# Patient Record
Sex: MALE | Race: WHITE | NOT HISPANIC OR LATINO | Employment: FULL TIME | URBAN - NONMETROPOLITAN AREA
[De-identification: names, ages, dates, MRNs, and addresses within clinical notes are randomized per-mention and may not be internally consistent; named-entity substitution may affect disease eponyms.]

---

## 2021-02-11 DIAGNOSIS — Z23 ENCOUNTER FOR IMMUNIZATION: ICD-10-CM

## 2022-08-31 ENCOUNTER — HOSPITAL ENCOUNTER (OUTPATIENT)
Dept: CT IMAGING | Facility: HOSPITAL | Age: 82
Discharge: HOME | End: 2022-08-31
Payer: MEDICARE

## 2022-08-31 DIAGNOSIS — K11.23 CHRONIC PAROTITIS: ICD-10-CM

## 2022-08-31 PROCEDURE — G1004 CDSM NDSC: HCPCS

## 2022-08-31 PROCEDURE — 70490 CT SOFT TISSUE NECK W/O DYE: CPT

## 2023-11-11 ENCOUNTER — APPOINTMENT (EMERGENCY)
Dept: CT IMAGING | Facility: HOSPITAL | Age: 83
End: 2023-11-11
Payer: MEDICARE

## 2023-11-11 ENCOUNTER — HOSPITAL ENCOUNTER (EMERGENCY)
Facility: HOSPITAL | Age: 83
Discharge: HOME | End: 2023-11-11
Attending: EMERGENCY MEDICINE
Payer: MEDICARE

## 2023-11-11 VITALS
BODY MASS INDEX: 24.78 KG/M2 | SYSTOLIC BLOOD PRESSURE: 121 MMHG | HEIGHT: 70 IN | OXYGEN SATURATION: 95 % | TEMPERATURE: 99.9 F | RESPIRATION RATE: 16 BRPM | DIASTOLIC BLOOD PRESSURE: 58 MMHG | WEIGHT: 173.06 LBS | HEART RATE: 97 BPM

## 2023-11-11 DIAGNOSIS — R50.9 ACUTE FEBRILE ILLNESS: Primary | ICD-10-CM

## 2023-11-11 LAB
ALBUMIN SERPL BCP-MCNC: 4 G/DL (ref 3.5–5)
ALP SERPL-CCNC: 172 U/L (ref 34–104)
ALT SERPL W P-5'-P-CCNC: 36 U/L (ref 7–52)
ANION GAP SERPL CALCULATED.3IONS-SCNC: 7 MMOL/L
APTT PPP: 30 SECONDS (ref 23–37)
AST SERPL W P-5'-P-CCNC: 23 U/L (ref 13–39)
BASOPHILS # BLD AUTO: 0.01 THOUSANDS/ÂΜL (ref 0–0.1)
BASOPHILS NFR BLD AUTO: 0 % (ref 0–1)
BILIRUB SERPL-MCNC: 0.79 MG/DL (ref 0.2–1)
BILIRUB UR QL STRIP: NEGATIVE
BUN SERPL-MCNC: 15 MG/DL (ref 5–25)
CALCIUM SERPL-MCNC: 9.1 MG/DL (ref 8.4–10.2)
CHLORIDE SERPL-SCNC: 101 MMOL/L (ref 96–108)
CLARITY UR: CLEAR
CO2 SERPL-SCNC: 27 MMOL/L (ref 21–32)
COLOR UR: YELLOW
CREAT SERPL-MCNC: 0.93 MG/DL (ref 0.6–1.3)
EOSINOPHIL # BLD AUTO: 0 THOUSAND/ÂΜL (ref 0–0.61)
EOSINOPHIL NFR BLD AUTO: 0 % (ref 0–6)
ERYTHROCYTE [DISTWIDTH] IN BLOOD BY AUTOMATED COUNT: 13.8 % (ref 11.6–15.1)
FLUAV RNA RESP QL NAA+PROBE: NEGATIVE
FLUBV RNA RESP QL NAA+PROBE: NEGATIVE
GFR SERPL CREATININE-BSD FRML MDRD: 75 ML/MIN/1.73SQ M
GLUCOSE SERPL-MCNC: 96 MG/DL (ref 65–140)
GLUCOSE UR STRIP-MCNC: NEGATIVE MG/DL
HCT VFR BLD AUTO: 42.9 % (ref 36.5–49.3)
HGB BLD-MCNC: 14 G/DL (ref 12–17)
HGB UR QL STRIP.AUTO: NEGATIVE
IMM GRANULOCYTES # BLD AUTO: 0.02 THOUSAND/UL (ref 0–0.2)
IMM GRANULOCYTES NFR BLD AUTO: 0 % (ref 0–2)
INR PPP: 0.99 (ref 0.84–1.19)
KETONES UR STRIP-MCNC: NEGATIVE MG/DL
LACTATE SERPL-SCNC: 0.8 MMOL/L (ref 0.5–2)
LEUKOCYTE ESTERASE UR QL STRIP: NEGATIVE
LYMPHOCYTES # BLD AUTO: 0.62 THOUSANDS/ÂΜL (ref 0.6–4.47)
LYMPHOCYTES NFR BLD AUTO: 13 % (ref 14–44)
MCH RBC QN AUTO: 30.4 PG (ref 26.8–34.3)
MCHC RBC AUTO-ENTMCNC: 32.6 G/DL (ref 31.4–37.4)
MCV RBC AUTO: 93 FL (ref 82–98)
MONOCYTES # BLD AUTO: 0.18 THOUSAND/ÂΜL (ref 0.17–1.22)
MONOCYTES NFR BLD AUTO: 4 % (ref 4–12)
NEUTROPHILS # BLD AUTO: 3.96 THOUSANDS/ÂΜL (ref 1.85–7.62)
NEUTS SEG NFR BLD AUTO: 83 % (ref 43–75)
NITRITE UR QL STRIP: NEGATIVE
NRBC BLD AUTO-RTO: 0 /100 WBCS
PH UR STRIP.AUTO: 8.5 [PH]
PLATELET # BLD AUTO: 227 THOUSANDS/UL (ref 149–390)
PMV BLD AUTO: 9.6 FL (ref 8.9–12.7)
POTASSIUM SERPL-SCNC: 4.2 MMOL/L (ref 3.5–5.3)
PROCALCITONIN SERPL-MCNC: 0.29 NG/ML
PROT SERPL-MCNC: 6.6 G/DL (ref 6.4–8.4)
PROT UR STRIP-MCNC: NEGATIVE MG/DL
PROTHROMBIN TIME: 13.4 SECONDS (ref 11.6–14.5)
RBC # BLD AUTO: 4.6 MILLION/UL (ref 3.88–5.62)
RSV RNA RESP QL NAA+PROBE: NEGATIVE
SARS-COV-2 RNA RESP QL NAA+PROBE: NEGATIVE
SODIUM SERPL-SCNC: 135 MMOL/L (ref 135–147)
SP GR UR STRIP.AUTO: 1.01 (ref 1–1.03)
UROBILINOGEN UR QL STRIP.AUTO: 1 E.U./DL
WBC # BLD AUTO: 4.79 THOUSAND/UL (ref 4.31–10.16)

## 2023-11-11 PROCEDURE — 96365 THER/PROPH/DIAG IV INF INIT: CPT

## 2023-11-11 PROCEDURE — 74177 CT ABD & PELVIS W/CONTRAST: CPT

## 2023-11-11 PROCEDURE — 80053 COMPREHEN METABOLIC PANEL: CPT | Performed by: EMERGENCY MEDICINE

## 2023-11-11 PROCEDURE — 86753 PROTOZOA ANTIBODY NOS: CPT | Performed by: EMERGENCY MEDICINE

## 2023-11-11 PROCEDURE — 99285 EMERGENCY DEPT VISIT HI MDM: CPT | Performed by: EMERGENCY MEDICINE

## 2023-11-11 PROCEDURE — 0241U HB NFCT DS VIR RESP RNA 4 TRGT: CPT | Performed by: EMERGENCY MEDICINE

## 2023-11-11 PROCEDURE — 81003 URINALYSIS AUTO W/O SCOPE: CPT | Performed by: EMERGENCY MEDICINE

## 2023-11-11 PROCEDURE — G1004 CDSM NDSC: HCPCS

## 2023-11-11 PROCEDURE — 84145 PROCALCITONIN (PCT): CPT | Performed by: EMERGENCY MEDICINE

## 2023-11-11 PROCEDURE — 85730 THROMBOPLASTIN TIME PARTIAL: CPT | Performed by: EMERGENCY MEDICINE

## 2023-11-11 PROCEDURE — 87040 BLOOD CULTURE FOR BACTERIA: CPT | Performed by: EMERGENCY MEDICINE

## 2023-11-11 PROCEDURE — 85610 PROTHROMBIN TIME: CPT | Performed by: EMERGENCY MEDICINE

## 2023-11-11 PROCEDURE — 86666 EHRLICHIA ANTIBODY: CPT | Performed by: EMERGENCY MEDICINE

## 2023-11-11 PROCEDURE — 36415 COLL VENOUS BLD VENIPUNCTURE: CPT | Performed by: EMERGENCY MEDICINE

## 2023-11-11 PROCEDURE — 71260 CT THORAX DX C+: CPT

## 2023-11-11 PROCEDURE — 83605 ASSAY OF LACTIC ACID: CPT | Performed by: EMERGENCY MEDICINE

## 2023-11-11 PROCEDURE — 93005 ELECTROCARDIOGRAM TRACING: CPT

## 2023-11-11 PROCEDURE — 99285 EMERGENCY DEPT VISIT HI MDM: CPT

## 2023-11-11 PROCEDURE — 85025 COMPLETE CBC W/AUTO DIFF WBC: CPT | Performed by: EMERGENCY MEDICINE

## 2023-11-11 RX ORDER — SODIUM CHLORIDE, SODIUM GLUCONATE, SODIUM ACETATE, POTASSIUM CHLORIDE, MAGNESIUM CHLORIDE, SODIUM PHOSPHATE, DIBASIC, AND POTASSIUM PHOSPHATE .53; .5; .37; .037; .03; .012; .00082 G/100ML; G/100ML; G/100ML; G/100ML; G/100ML; G/100ML; G/100ML
1000 INJECTION, SOLUTION INTRAVENOUS ONCE
Status: COMPLETED | OUTPATIENT
Start: 2023-11-11 | End: 2023-11-11

## 2023-11-11 RX ORDER — DOXYCYCLINE HYCLATE 100 MG/1
100 CAPSULE ORAL 2 TIMES DAILY
Qty: 20 CAPSULE | Refills: 0 | Status: SHIPPED | OUTPATIENT
Start: 2023-11-11 | End: 2023-11-21

## 2023-11-11 RX ORDER — FLUTICASONE FUROATE AND VILANTEROL 100; 25 UG/1; UG/1
1 POWDER RESPIRATORY (INHALATION) DAILY
COMMUNITY

## 2023-11-11 RX ORDER — FLUTICASONE PROPIONATE 50 MCG
2 SPRAY, SUSPENSION (ML) NASAL DAILY
COMMUNITY

## 2023-11-11 RX ORDER — TAMSULOSIN HYDROCHLORIDE 0.4 MG/1
0.4 CAPSULE ORAL
COMMUNITY

## 2023-11-11 RX ORDER — ACETAMINOPHEN 325 MG/1
650 TABLET ORAL ONCE
Status: COMPLETED | OUTPATIENT
Start: 2023-11-11 | End: 2023-11-11

## 2023-11-11 RX ORDER — SIMVASTATIN 40 MG
40 TABLET ORAL
COMMUNITY
Start: 2023-07-24

## 2023-11-11 RX ADMIN — IOHEXOL 100 ML: 350 INJECTION, SOLUTION INTRAVENOUS at 15:33

## 2023-11-11 RX ADMIN — ACETAMINOPHEN 650 MG: 325 TABLET, FILM COATED ORAL at 14:42

## 2023-11-11 RX ADMIN — SODIUM CHLORIDE, SODIUM GLUCONATE, SODIUM ACETATE, POTASSIUM CHLORIDE, MAGNESIUM CHLORIDE, SODIUM PHOSPHATE, DIBASIC, AND POTASSIUM PHOSPHATE 1000 ML: .53; .5; .37; .037; .03; .012; .00082 INJECTION, SOLUTION INTRAVENOUS at 14:41

## 2023-11-11 NOTE — ED PROVIDER NOTES
History  Chief Complaint   Patient presents with    Fever     Pt c/o onset headache, fever, body aches, weakness, fatigue today. Pt mentioned tick bites 3.5 wks ago given rx then left for [place] shortly after, hospitalized on vacation per hypoxia, pneumonia given abx returning home 1wk ago. Pt stated started feeling better but this morning started with sx. Patient is an active 78-year-old male presenting the emergency room with report of generalized weakness and fever. Patient has a temperature upon arrival of 102.9. Patient has a complicated history of having recently travel to the [facility]. Patient reports that he was slightly ill prior to this and when he arrived down there he was feeling more ill and subsequently went to a clinic. Patient was noted to have elevated LFTs. There was a suspicion of pneumonia patient was transferred to another [place] where he was admitted and treated for pneumonia. Patient was subsequently discharged on p.o. Augmentin. He subsequently went home and was then further evaluated by PCP and had blood work obtained. LFTs were still elevated. Of note is that the patient does report to tick bites prior to travel. Generally achy that time. Patient did have Lyme testing recently which was negative. History provided by:  Spouse and patient  Fever  Severity:  Severe  Associated symptoms: fatigue and fever    Associated symptoms: no chest pain, no cough, no nausea, no shortness of breath, no vomiting and no wheezing        Prior to Admission Medications   Prescriptions Last Dose Informant Patient Reported? Taking?    Fluticasone Furoate-Vilanterol 100-25 mcg/actuation inhaler 11/10/2023  Yes Yes   Sig: Inhale 1 puff daily   fluticasone (FLONASE) 50 mcg/act nasal spray 11/10/2023  Yes Yes   Si sprays into each nostril daily   simvastatin (ZOCOR) 40 mg tablet 11/10/2023  Yes Yes   Sig: Take 40 mg by mouth daily at bedtime   tamsulosin (FLOMAX) 0.4 mg 11/11/2023  Yes Yes   Sig: Take 0.4 mg by mouth daily with dinner      Facility-Administered Medications: None       Past Medical History:   Diagnosis Date    Asthma     Hyperlipidemia     Urinary retention        Past Surgical History:   Procedure Laterality Date    HERNIA REPAIR      REPLACEMENT TOTAL KNEE BILATERAL      SHOULDER SURGERY Right     TONSILLECTOMY         History reviewed. No pertinent family history. I have reviewed and agree with the history as documented. E-Cigarette/Vaping    E-Cigarette Use Never User      E-Cigarette/Vaping Substances     Social History     Tobacco Use    Smoking status: Never    Smokeless tobacco: Never   Vaping Use    Vaping Use: Never used   Substance Use Topics    Alcohol use: Not Currently    Drug use: Never       Review of Systems   Constitutional:  Positive for fatigue and fever. Respiratory: Negative. Negative for cough, shortness of breath and wheezing. Cardiovascular:  Negative for chest pain and palpitations. Gastrointestinal:  Positive for constipation (Resolved). Negative for nausea and vomiting. Genitourinary: Negative. Neurological:  Positive for dizziness and light-headedness. Physical Exam  Physical Exam  Vitals and nursing note reviewed. Constitutional:       Appearance: Normal appearance. He is well-developed. He is not ill-appearing or toxic-appearing. HENT:      Head: Normocephalic and atraumatic. Hair is normal.      Jaw: No pain on movement. Right Ear: External ear normal.      Left Ear: External ear normal.      Nose: Nose normal. No congestion. Mouth/Throat:      Mouth: Mucous membranes are moist.   Eyes:      General: Lids are normal. No scleral icterus. Extraocular Movements: Extraocular movements intact. Conjunctiva/sclera: Conjunctivae normal.      Pupils: Pupils are equal, round, and reactive to light. Cardiovascular:      Rate and Rhythm: Regular rhythm. Tachycardia present.       Heart sounds: Normal heart sounds. No murmur heard. Pulmonary:      Effort: Pulmonary effort is normal. No respiratory distress. Breath sounds: Normal breath sounds. No decreased breath sounds, wheezing, rhonchi or rales. Abdominal:      General: Abdomen is flat. There is no distension. Palpations: Abdomen is soft. Abdomen is not rigid. Tenderness: There is no abdominal tenderness. There is no guarding or rebound. Musculoskeletal:         General: No swelling, tenderness, deformity or signs of injury. Normal range of motion. Cervical back: Normal range of motion and neck supple. Skin:     General: Skin is warm and dry. Coloration: Skin is not pale. Findings: No rash. Neurological:      General: No focal deficit present. Mental Status: He is alert and oriented to person, place, and time. Mental status is at baseline.    Psychiatric:         Attention and Perception: Attention normal.         Mood and Affect: Mood normal.         Speech: Speech normal.         Behavior: Behavior normal.         Vital Signs  ED Triage Vitals [11/11/23 1415]   Temperature Pulse Respirations Blood Pressure SpO2   (!) 102.9 °F (39.4 °C) (!) 116 20 120/89 96 %      Temp Source Heart Rate Source Patient Position - Orthostatic VS BP Location FiO2 (%)   Oral Monitor Lying Left arm --      Pain Score       9           Vitals:    11/11/23 1515 11/11/23 1545 11/11/23 1600 11/11/23 1702   BP: 118/60 118/60 108/58 121/58   Pulse: 102 101 97    Patient Position - Orthostatic VS:    Sitting         Visual Acuity      ED Medications  Medications   acetaminophen (TYLENOL) tablet 650 mg (650 mg Oral Given 11/11/23 1442)   multi-electrolyte (ISOLYTE-S PH 7.4) bolus 1,000 mL (0 mL Intravenous Stopped 11/11/23 1541)   iohexol (OMNIPAQUE) 350 MG/ML injection (SINGLE-DOSE) 100 mL (100 mL Intravenous Given 11/11/23 1533)       Diagnostic Studies  Results Reviewed       Procedure Component Value Units Date/Time    UA w Reflex to Microscopic w Reflex to Culture [[id number]]  (Abnormal) Collected: 11/11/23 1630    Lab Status: Final result Specimen: Urine, Clean Catch Updated: 11/11/23 1640     Color, UA Yellow     Clarity, UA Clear     Specific Gravity, UA 1.010     pH, UA 8.5     Leukocytes, UA Negative     Nitrite, UA Negative     Protein, UA Negative mg/dl      Glucose, UA Negative mg/dl      Ketones, UA Negative mg/dl      Urobilinogen, UA 1.0 E.U./dl      Bilirubin, UA Negative     Occult Blood, UA Negative    FLU/RSV/COVID - if FLU/RSV clinically relevant [[id number]]  (Normal) Collected: 11/11/23 1430    Lab Status: Final result Specimen: Nares from Nasopharyngeal Swab Updated: 11/11/23 1619     SARS-CoV-2 Negative     INFLUENZA A PCR Negative     INFLUENZA B PCR Negative     RSV PCR Negative    Narrative:      FOR PEDIATRIC PATIENTS - copy/paste COVID Guidelines URL to browser: [email or web address]    SARS-CoV-2 assay is a Nucleic Acid Amplification assay intended for the  qualitative detection of nucleic acid from SARS-CoV-2 in nasopharyngeal  swabs. Results are for the presumptive identification of SARS-CoV-2 RNA. Positive results are indicative of infection with SARS-CoV-2, the virus  causing COVID-19, but do not rule out bacterial infection or co-infection  with other viruses. Laboratories within the [facility] and its  territories are required to report all positive results to the appropriate  public health authorities. Negative results do not preclude SARS-CoV-2  infection and should not be used as the sole basis for treatment or other  patient management decisions. Negative results must be combined with  clinical observations, patient history, and epidemiological information. This test has not been FDA cleared or approved. This test has been authorized by FDA under an Emergency Use Authorization  (EUA).  This test is only authorized for the duration of time the  declaration that circumstances exist justifying the authorization of the  emergency use of an in vitro diagnostic tests for detection of SARS-CoV-2  virus and/or diagnosis of COVID-19 infection under section 564(b)(1) of  the Act, 21 U. S.C. 715APW-2(T)(9), unless the authorization is terminated  or revoked sooner. The test has been validated but independent review by FDA  and CLIA is pending. Test performed using [employer]: This RT-PCR assay targets N2,  a region unique to SARS-CoV-2. A conserved region in the E-gene was chosen  for pan-Sarbecovirus detection which includes SARS-CoV-2. According to CMS-2020-01-R, this platform meets the definition of high-throughput technology.     Procalcitonin [[id number]]  (Abnormal) Collected: 11/11/23 1430    Lab Status: Final result Specimen: Blood from Arm, Right Updated: 11/11/23 1505     Procalcitonin 0.29 ng/ml     Comprehensive metabolic panel [[id number]]  (Abnormal) Collected: 11/11/23 1430    Lab Status: Final result Specimen: Blood from Arm, Right Updated: 11/11/23 1458     Sodium 135 mmol/L      Potassium 4.2 mmol/L      Chloride 101 mmol/L      CO2 27 mmol/L      ANION GAP 7 mmol/L      BUN 15 mg/dL      Creatinine 0.93 mg/dL      Glucose 96 mg/dL      Calcium 9.1 mg/dL      AST 23 U/L      ALT 36 U/L      Alkaline Phosphatase 172 U/L      Total Protein 6.6 g/dL      Albumin 4.0 g/dL      Total Bilirubin 0.79 mg/dL      eGFR 75 ml/min/1.73sq m     Narrative:      [place] guidelines for Chronic Kidney Disease (CKD):     Stage 1 with normal or high GFR (GFR > 90 mL/min/1.73 square meters)    Stage 2 Mild CKD (GFR = 60-89 mL/min/1.73 square meters)    Stage 3A Moderate CKD (GFR = 45-59 mL/min/1.73 square meters)    Stage 3B Moderate CKD (GFR = 30-44 mL/min/1.73 square meters)    Stage 4 Severe CKD (GFR = 15-29 mL/min/1.73 square meters)    Stage 5 End Stage CKD (GFR <15 mL/min/1.73 square meters)  Note: GFR calculation is accurate only with a steady state creatinine    Lactic acid [[id number]]  (Normal) Collected: 11/11/23 1430    Lab Status: Final result Specimen: Blood from Arm, Right Updated: 11/11/23 1457     LACTIC ACID 0.8 mmol/L     Narrative:      Result may be elevated if tourniquet was used during collection. Anabela Sero [[id number]]  (Normal) Collected: 11/11/23 1430    Lab Status: Final result Specimen: Blood from Arm, Right Updated: 11/11/23 1452     Protime 13.4 seconds      INR 0.99    APTT [[id number]]  (Normal) Collected: 11/11/23 1430    Lab Status: Final result Specimen: Blood from Arm, Right Updated: 11/11/23 1452     PTT 30 seconds     Babesia microti antibody, IgG & Igm [[id number]] Collected: 11/11/23 1441    Lab Status: In process Specimen: Blood from Arm, Left Updated: 28/42/36 6258    Ehrlichia antibody panel [[id number]] Collected: 11/11/23 1441    Lab Status: In process Specimen: Blood from Arm, Left Updated: 11/11/23 1445    CBC and differential [[id number]]  (Abnormal) Collected: 11/11/23 1430    Lab Status: Final result Specimen: Blood from Arm, Right Updated: 11/11/23 1438     WBC 4.79 Thousand/uL      RBC 4.60 Million/uL      Hemoglobin 14.0 g/dL      Hematocrit 42.9 %      MCV 93 fL      MCH 30.4 pg      MCHC 32.6 g/dL      RDW 13.8 %      MPV 9.6 fL      Platelets 033 Thousands/uL      nRBC 0 /100 WBCs      Neutrophils Relative 83 %      Immat GRANS % 0 %      Lymphocytes Relative 13 %      Monocytes Relative 4 %      Eosinophils Relative 0 %      Basophils Relative 0 %      Neutrophils Absolute 3.96 Thousands/µL      Immature Grans Absolute 0.02 Thousand/uL      Lymphocytes Absolute 0.62 Thousands/µL      Monocytes Absolute 0.18 Thousand/µL      Eosinophils Absolute 0.00 Thousand/µL      Basophils Absolute 0.01 Thousands/µL     Blood culture #1 [[id number]] Collected: 11/11/23 1430    Lab Status:  In process Specimen: Blood from Arm, Right Updated: 11/11/23 1435    Blood culture #2 [[id number]] Collected: 11/11/23 1429    Lab Status: In process Specimen: Blood from Arm, Left Updated: 11/11/23 1434                   CT chest abdomen pelvis w contrast   Final Result by Brandt Peabody, MD (11/11 1613)      Bibasilar atelectasis. No pneumonia. No acute inflammatory process identified in the abdomen or pelvis. Simple hepatic and left renal cysts. Diffuse colonic diverticulosis without diverticulitis. Prostatomegaly. Workstation performed: [id number]                    Procedures  ECG 12 Lead Documentation Only    Date/Time: 11/11/2023 2:41 PM    Performed by: Letitia Gómez DO  Authorized by: Letitia Gómez DO    Indications / Diagnosis:  Chest pain  ECG reviewed by me, the ED Provider: yes    Patient location:  ED  Interpretation:     Interpretation: normal    Rate:     ECG rate assessment: tachycardic    Rhythm:     Rhythm: sinus rhythm and sinus tachycardia    Ectopy:     Ectopy: none    QRS:     QRS axis:  Normal  Conduction:     Conduction: normal    ST segments:     ST segments:  Normal  T waves:     T waves: normal             ED Course  ED Course as of 11/11/23 1704   Sat Nov 11, 2023   1437 Recent Lyme study was negative. Was performed in the night states when patient returned from the [facility]. 1441 There is a possibility the patient did not seroconvert and is still suffering from Lyme disease. 1442 Reports that he had negative COVID testing in the [facility]. 1621 No acute abnormality identified on the patient's CT chest abdomen pelvis. Chronic findings noted. 1621 LFT abnormalities have almost completely resolved. 1653 Urinalysis negative. Have discussed with patient and his wife the resolving symptomatology. Recommend continued follow-up with PCP. Also recommended a short course of doxycycline pending the results of further tickborne illnesses testing. Is in agreement with same.                                SBIRT 22yo+      Flowsheet Row Most Recent Value   Initial Alcohol Screen: US AUDIT-C     1. How often do you have a drink containing alcohol? 0 Filed at: 11/11/2023 1512   2. How many drinks containing alcohol do you have on a typical day you are drinking? 0 Filed at: 11/11/2023 1512   3a. Male UNDER 65: How often do you have five or more drinks on one occasion? 0 Filed at: 11/11/2023 1512   3b. FEMALE Any Age, or MALE 65+: How often do you have 4 or more drinks on one occassion? 0 Filed at: 11/11/2023 1512   Audit-C Score 0 Filed at: 11/11/2023 1512   [place]: How many times in the past year have you. .. Used an illegal drug or used a prescription medication for non-medical reasons? Never Filed at: 11/11/2023 1512                      Medical Decision Making  Patient presented to the emergency department and a MSE was performed. The patient was evaluated for complaint related to acute  fever. Patient is potentially at risk for, but not limited to, pneumonia, urinary tract infection, cholecystitis, appendicitis, diverticulitis,cellulitis, otitis media, strep pharyngitis, meningitis, or uncomplicated viral related illness. Several of these diagnoses have been evaluated and ruled out by history and physical.  As needed, patient will be further evaluated with laboratory and imaging studies. Higher level diagnostics, such as CT imaging or ultrasound, may also be required. Please see work-up portion of the note for further evaluation of patient's risk. Socioeconomic factors were also considered as part of the decision-making process. Unless otherwise stated in the chart or patient is admitted as elsewhere documented, any previously prescribed medications will be maintained. Problems Addressed:  Acute febrile illness: complicated acute illness or injury    Amount and/or Complexity of Data Reviewed  Labs: ordered. Radiology: ordered. Risk  OTC drugs. Prescription drug management.              Disposition  Final diagnoses:   Acute febrile illness     Time reflects when diagnosis was documented in both MDM as applicable and the Disposition within this note       Time User Action Codes Description Comment    11/11/2023  4:54 PM Tere Echevarria Add [R50.9] Acute febrile illness           ED Disposition       ED Disposition   Discharge    Condition   Stable    Date/Time   Sat Nov 11, 2023 [address] discharge to home/self care. Follow-up Information       Follow up With Specialties Details Why Contact Miroslava Streeter Sender, DO  In 1 week If not better [address] [place] [address]  [phone]              Patient's Medications   Discharge Prescriptions    DOXYCYCLINE HYCLATE (VIBRAMYCIN) 100 MG CAPSULE    Take 1 capsule (100 mg total) by mouth 2 (two) times a day for 10 days       Start Date: 11/11/2023End Date: 11/21/2023       Order Dose: 100 mg       Quantity: 20 capsule    Refills: 0       No discharge procedures on file.     PDMP Review       None            ED Provider  Electronically Signed by             Erika Whitley DO  11/11/23 8010

## 2023-11-11 NOTE — DISCHARGE INSTRUCTIONS
We are maintaining you on a course of doxycycline pending the results of further tickborne illnesses testing. If this testing comes back negative the doxycycline can be discontinued. We recommend continue to follow-up with your primary care provider.

## 2023-11-14 LAB
ATRIAL RATE: 105 BPM
P AXIS: 51 DEGREES
PR INTERVAL: 174 MS
QRS AXIS: 57 DEGREES
QRSD INTERVAL: 78 MS
QT INTERVAL: 322 MS
QTC INTERVAL: 425 MS
T WAVE AXIS: 43 DEGREES
VENTRICULAR RATE: 105 BPM

## 2023-11-16 LAB
A PHAGOCYTOPH IGG TITR SER IF: NEGATIVE {TITER}
A PHAGOCYTOPH IGM TITR SER IF: NEGATIVE {TITER}
B MICROTI IGG TITR SER: NORMAL {TITER}
B MICROTI IGM TITR SER: NORMAL {TITER}
BACTERIA BLD CULT: NORMAL
BACTERIA BLD CULT: NORMAL
E CHAFFEENSIS IGG TITR SER IF: NEGATIVE {TITER}
E CHAFFEENSIS IGM TITR SER IF: NEGATIVE {TITER}
RESULT/COMMENT: NORMAL
RESULT/COMMENT: NORMAL

## 2024-09-08 ENCOUNTER — APPOINTMENT (EMERGENCY)
Dept: CT IMAGING | Facility: HOSPITAL | Age: 84
DRG: 195 | End: 2024-09-08
Payer: MEDICARE

## 2024-09-08 ENCOUNTER — HOSPITAL ENCOUNTER (INPATIENT)
Facility: HOSPITAL | Age: 84
LOS: 2 days | Discharge: HOME | DRG: 195 | End: 2024-09-10
Attending: EMERGENCY MEDICINE | Admitting: FAMILY MEDICINE
Payer: MEDICARE

## 2024-09-08 ENCOUNTER — APPOINTMENT (EMERGENCY)
Dept: RADIOLOGY | Facility: HOSPITAL | Age: 84
DRG: 195 | End: 2024-09-08
Payer: MEDICARE

## 2024-09-08 DIAGNOSIS — R06.02 SOB (SHORTNESS OF BREATH): Primary | ICD-10-CM

## 2024-09-08 DIAGNOSIS — J18.9 MULTIFOCAL PNEUMONIA: ICD-10-CM

## 2024-09-08 PROBLEM — R79.89 ELEVATED LACTIC ACID LEVEL: Status: ACTIVE | Noted: 2024-09-08

## 2024-09-08 LAB
ALBUMIN SERPL BCG-MCNC: 4 G/DL (ref 3.5–5)
ALP SERPL-CCNC: 130 U/L (ref 34–104)
ALT SERPL W P-5'-P-CCNC: 64 U/L (ref 7–52)
ANION GAP SERPL CALCULATED.3IONS-SCNC: 8 MMOL/L (ref 4–13)
AST SERPL W P-5'-P-CCNC: 36 U/L (ref 13–39)
BASOPHILS # BLD AUTO: 0.03 THOUSANDS/ÂΜL (ref 0–0.1)
BASOPHILS NFR BLD AUTO: 0 % (ref 0–1)
BILIRUB SERPL-MCNC: 0.58 MG/DL (ref 0.2–1)
BUN SERPL-MCNC: 13 MG/DL (ref 5–25)
CALCIUM SERPL-MCNC: 9.8 MG/DL (ref 8.4–10.2)
CARDIAC TROPONIN I PNL SERPL HS: 4 NG/L
CHLORIDE SERPL-SCNC: 101 MMOL/L (ref 96–108)
CO2 SERPL-SCNC: 29 MMOL/L (ref 21–32)
CREAT SERPL-MCNC: 0.85 MG/DL (ref 0.6–1.3)
EOSINOPHIL # BLD AUTO: 0.13 THOUSAND/ÂΜL (ref 0–0.61)
EOSINOPHIL NFR BLD AUTO: 2 % (ref 0–6)
ERYTHROCYTE [DISTWIDTH] IN BLOOD BY AUTOMATED COUNT: 12.6 % (ref 11.6–15.1)
GFR SERPL CREATININE-BSD FRML MDRD: 79 ML/MIN/1.73SQ M
GLUCOSE SERPL-MCNC: 134 MG/DL (ref 65–140)
HCT VFR BLD AUTO: 42.6 % (ref 36.5–49.3)
HGB BLD-MCNC: 14 G/DL (ref 12–17)
IMM GRANULOCYTES # BLD AUTO: 0.03 THOUSAND/UL (ref 0–0.2)
IMM GRANULOCYTES NFR BLD AUTO: 0 % (ref 0–2)
LACTATE SERPL-SCNC: 2.3 MMOL/L (ref 0.5–2)
LYMPHOCYTES # BLD AUTO: 1.44 THOUSANDS/ÂΜL (ref 0.6–4.47)
LYMPHOCYTES NFR BLD AUTO: 20 % (ref 14–44)
MCH RBC QN AUTO: 30.8 PG (ref 26.8–34.3)
MCHC RBC AUTO-ENTMCNC: 32.9 G/DL (ref 31.4–37.4)
MCV RBC AUTO: 94 FL (ref 82–98)
MONOCYTES # BLD AUTO: 0.33 THOUSAND/ÂΜL (ref 0.17–1.22)
MONOCYTES NFR BLD AUTO: 5 % (ref 4–12)
NEUTROPHILS # BLD AUTO: 5.44 THOUSANDS/ÂΜL (ref 1.85–7.62)
NEUTS SEG NFR BLD AUTO: 73 % (ref 43–75)
NRBC BLD AUTO-RTO: 0 /100 WBCS
PLATELET # BLD AUTO: 318 THOUSANDS/UL (ref 149–390)
PMV BLD AUTO: 9.3 FL (ref 8.9–12.7)
POTASSIUM SERPL-SCNC: 4.1 MMOL/L (ref 3.5–5.3)
PROCALCITONIN SERPL-MCNC: <0.05 NG/ML
PROT SERPL-MCNC: 8.3 G/DL (ref 6.4–8.4)
RBC # BLD AUTO: 4.54 MILLION/UL (ref 3.88–5.62)
SODIUM SERPL-SCNC: 138 MMOL/L (ref 135–147)
WBC # BLD AUTO: 7.4 THOUSAND/UL (ref 4.31–10.16)

## 2024-09-08 PROCEDURE — 87040 BLOOD CULTURE FOR BACTERIA: CPT | Performed by: PHYSICIAN ASSISTANT

## 2024-09-08 PROCEDURE — 99223 1ST HOSP IP/OBS HIGH 75: CPT | Performed by: FAMILY MEDICINE

## 2024-09-08 PROCEDURE — 96375 TX/PRO/DX INJ NEW DRUG ADDON: CPT

## 2024-09-08 PROCEDURE — 94760 N-INVAS EAR/PLS OXIMETRY 1: CPT

## 2024-09-08 PROCEDURE — 71250 CT THORAX DX C-: CPT

## 2024-09-08 PROCEDURE — 93005 ELECTROCARDIOGRAM TRACING: CPT

## 2024-09-08 PROCEDURE — 80053 COMPREHEN METABOLIC PANEL: CPT | Performed by: EMERGENCY MEDICINE

## 2024-09-08 PROCEDURE — 96361 HYDRATE IV INFUSION ADD-ON: CPT

## 2024-09-08 PROCEDURE — 99285 EMERGENCY DEPT VISIT HI MDM: CPT | Performed by: PHYSICIAN ASSISTANT

## 2024-09-08 PROCEDURE — 71045 X-RAY EXAM CHEST 1 VIEW: CPT

## 2024-09-08 PROCEDURE — 94640 AIRWAY INHALATION TREATMENT: CPT

## 2024-09-08 PROCEDURE — 85025 COMPLETE CBC W/AUTO DIFF WBC: CPT | Performed by: EMERGENCY MEDICINE

## 2024-09-08 PROCEDURE — 87449 NOS EACH ORGANISM AG IA: CPT

## 2024-09-08 PROCEDURE — 84145 PROCALCITONIN (PCT): CPT

## 2024-09-08 PROCEDURE — 84484 ASSAY OF TROPONIN QUANT: CPT | Performed by: EMERGENCY MEDICINE

## 2024-09-08 PROCEDURE — 96365 THER/PROPH/DIAG IV INF INIT: CPT

## 2024-09-08 PROCEDURE — 94644 CONT INHLJ TX 1ST HOUR: CPT

## 2024-09-08 PROCEDURE — 99285 EMERGENCY DEPT VISIT HI MDM: CPT

## 2024-09-08 PROCEDURE — 36415 COLL VENOUS BLD VENIPUNCTURE: CPT | Performed by: PHYSICIAN ASSISTANT

## 2024-09-08 PROCEDURE — 83605 ASSAY OF LACTIC ACID: CPT | Performed by: PHYSICIAN ASSISTANT

## 2024-09-08 PROCEDURE — 94664 DEMO&/EVAL PT USE INHALER: CPT

## 2024-09-08 RX ORDER — SODIUM CHLORIDE, SODIUM GLUCONATE, SODIUM ACETATE, POTASSIUM CHLORIDE, MAGNESIUM CHLORIDE, SODIUM PHOSPHATE, DIBASIC, AND POTASSIUM PHOSPHATE .53; .5; .37; .037; .03; .012; .00082 G/100ML; G/100ML; G/100ML; G/100ML; G/100ML; G/100ML; G/100ML
75 INJECTION, SOLUTION INTRAVENOUS CONTINUOUS
Status: DISCONTINUED | OUTPATIENT
Start: 2024-09-08 | End: 2024-09-09

## 2024-09-08 RX ORDER — HEPARIN SODIUM 5000 [USP'U]/ML
5000 INJECTION, SOLUTION INTRAVENOUS; SUBCUTANEOUS EVERY 8 HOURS SCHEDULED
Status: DISCONTINUED | OUTPATIENT
Start: 2024-09-08 | End: 2024-09-10 | Stop reason: HOSPADM

## 2024-09-08 RX ORDER — PRAVASTATIN SODIUM 80 MG/1
80 TABLET ORAL
Status: DISCONTINUED | OUTPATIENT
Start: 2024-09-08 | End: 2024-09-10 | Stop reason: HOSPADM

## 2024-09-08 RX ORDER — LEVALBUTEROL INHALATION SOLUTION 0.63 MG/3ML
0.63 SOLUTION RESPIRATORY (INHALATION) EVERY 6 HOURS PRN
Status: DISCONTINUED | OUTPATIENT
Start: 2024-09-08 | End: 2024-09-10 | Stop reason: HOSPADM

## 2024-09-08 RX ORDER — SODIUM CHLORIDE FOR INHALATION 0.9 %
12 VIAL, NEBULIZER (ML) INHALATION ONCE
Status: COMPLETED | OUTPATIENT
Start: 2024-09-08 | End: 2024-09-08

## 2024-09-08 RX ORDER — ALBUTEROL SULFATE 5 MG/ML
10 SOLUTION RESPIRATORY (INHALATION) ONCE
Status: COMPLETED | OUTPATIENT
Start: 2024-09-08 | End: 2024-09-08

## 2024-09-08 RX ORDER — GUAIFENESIN 600 MG/1
600 TABLET, EXTENDED RELEASE ORAL 2 TIMES DAILY
Status: DISCONTINUED | OUTPATIENT
Start: 2024-09-08 | End: 2024-09-10 | Stop reason: HOSPADM

## 2024-09-08 RX ORDER — FLUTICASONE FUROATE AND VILANTEROL 100; 25 UG/1; UG/1
1 POWDER RESPIRATORY (INHALATION) DAILY
Status: DISCONTINUED | OUTPATIENT
Start: 2024-09-09 | End: 2024-09-10 | Stop reason: HOSPADM

## 2024-09-08 RX ORDER — ACETAMINOPHEN 325 MG/1
650 TABLET ORAL EVERY 6 HOURS PRN
Status: DISCONTINUED | OUTPATIENT
Start: 2024-09-08 | End: 2024-09-10 | Stop reason: HOSPADM

## 2024-09-08 RX ORDER — DIPHENHYDRAMINE HYDROCHLORIDE 50 MG/ML
50 INJECTION INTRAMUSCULAR; INTRAVENOUS ONCE
Status: COMPLETED | OUTPATIENT
Start: 2024-09-08 | End: 2024-09-08

## 2024-09-08 RX ORDER — FLUTICASONE PROPIONATE 50 MCG
2 SPRAY, SUSPENSION (ML) NASAL DAILY
Status: DISCONTINUED | OUTPATIENT
Start: 2024-09-08 | End: 2024-09-10 | Stop reason: HOSPADM

## 2024-09-08 RX ORDER — METHYLPREDNISOLONE SODIUM SUCCINATE 125 MG/2ML
125 INJECTION, POWDER, LYOPHILIZED, FOR SOLUTION INTRAMUSCULAR; INTRAVENOUS ONCE
Status: COMPLETED | OUTPATIENT
Start: 2024-09-08 | End: 2024-09-08

## 2024-09-08 RX ORDER — TAMSULOSIN HYDROCHLORIDE 0.4 MG/1
0.4 CAPSULE ORAL DAILY
Status: DISCONTINUED | OUTPATIENT
Start: 2024-09-09 | End: 2024-09-10 | Stop reason: HOSPADM

## 2024-09-08 RX ORDER — LEVOFLOXACIN 5 MG/ML
750 INJECTION, SOLUTION INTRAVENOUS EVERY 24 HOURS
Status: DISCONTINUED | OUTPATIENT
Start: 2024-09-08 | End: 2024-09-08

## 2024-09-08 RX ORDER — CEFTRIAXONE 1 G/50ML
1000 INJECTION, SOLUTION INTRAVENOUS ONCE
Status: COMPLETED | OUTPATIENT
Start: 2024-09-08 | End: 2024-09-08

## 2024-09-08 RX ADMIN — ISODIUM CHLORIDE 12 ML: 0.03 SOLUTION RESPIRATORY (INHALATION) at 09:25

## 2024-09-08 RX ADMIN — SODIUM CHLORIDE, SODIUM GLUCONATE, SODIUM ACETATE, POTASSIUM CHLORIDE, MAGNESIUM CHLORIDE, SODIUM PHOSPHATE, DIBASIC, AND POTASSIUM PHOSPHATE 75 ML/HR: .53; .5; .37; .037; .03; .012; .00082 INJECTION, SOLUTION INTRAVENOUS at 16:21

## 2024-09-08 RX ADMIN — IPRATROPIUM BROMIDE 1 MG: 0.5 SOLUTION RESPIRATORY (INHALATION) at 09:24

## 2024-09-08 RX ADMIN — CEFTRIAXONE 1000 MG: 1 INJECTION, SOLUTION INTRAVENOUS at 09:17

## 2024-09-08 RX ADMIN — HEPARIN SODIUM 5000 UNITS: 5000 INJECTION INTRAVENOUS; SUBCUTANEOUS at 21:54

## 2024-09-08 RX ADMIN — SODIUM CHLORIDE 500 ML: 0.9 INJECTION, SOLUTION INTRAVENOUS at 09:16

## 2024-09-08 RX ADMIN — SODIUM CHLORIDE 500 ML: 0.9 INJECTION, SOLUTION INTRAVENOUS at 14:54

## 2024-09-08 RX ADMIN — GUAIFENESIN 600 MG: 600 TABLET ORAL at 17:30

## 2024-09-08 RX ADMIN — AMPICILLIN SODIUM AND SULBACTAM SODIUM 3 G: 100; 50 INJECTION, POWDER, FOR SOLUTION INTRAVENOUS at 17:30

## 2024-09-08 RX ADMIN — AMPICILLIN SODIUM AND SULBACTAM SODIUM 3 G: 100; 50 INJECTION, POWDER, FOR SOLUTION INTRAVENOUS at 12:23

## 2024-09-08 RX ADMIN — DIPHENHYDRAMINE HYDROCHLORIDE 50 MG: 50 INJECTION, SOLUTION INTRAMUSCULAR; INTRAVENOUS at 09:37

## 2024-09-08 RX ADMIN — ALBUTEROL SULFATE 10 MG: 2.5 SOLUTION RESPIRATORY (INHALATION) at 09:24

## 2024-09-08 RX ADMIN — METHYLPREDNISOLONE SODIUM SUCCINATE 125 MG: 125 INJECTION, POWDER, FOR SOLUTION INTRAMUSCULAR; INTRAVENOUS at 09:17

## 2024-09-08 RX ADMIN — HEPARIN SODIUM 5000 UNITS: 5000 INJECTION INTRAVENOUS; SUBCUTANEOUS at 14:54

## 2024-09-08 RX ADMIN — PRAVASTATIN SODIUM 80 MG: 80 TABLET ORAL at 17:30

## 2024-09-08 RX ADMIN — FLUTICASONE PROPIONATE 2 SPRAY: 50 SPRAY, METERED NASAL at 14:53

## 2024-09-08 NOTE — RESPIRATORY THERAPY NOTE
RT Protocol Note  James Ortiz 84 y.o. male MRN: [id number]  Unit/Bed#: -01 Encounter: [id number]    Assessment    Principal Problem:    Multifocal pneumonia  Active Problems:    Asthma    Elevated lactic acid level      Home Pulmonary Medications:  Fluticasone furoate-vilanterol 100/25, PRN albuterol       Past Medical History:   Diagnosis Date    Asthma     Hyperlipidemia     Urinary retention      Social History     Socioeconomic History    Marital status: /Civil Union     Spouse name: None    Number of children: None    Years of education: None    Highest education level: None   Occupational History    None   Tobacco Use    Smoking status: Never    Smokeless tobacco: Never   Vaping Use    Vaping status: Never Used   Substance and Sexual Activity    Alcohol use: Not Currently    Drug use: Never    Sexual activity: None   Other Topics Concern    None   Social History Narrative    None     Social Determinants of Health     Financial Resource Strain: Not on file   Food Insecurity: Not on file   Transportation Needs: Not on file   Physical Activity: Not on file   Stress: Not on file   Social Connections: Unknown (6/18/2024)    Received from [employer]    Social [employer]     How often do you feel lonely or isolated from those around you? (Adult - for ages 18 years and over): Not on file   Intimate Partner Violence: Not on file   Housing Stability: Not on file       Subjective         Objective    Physical Exam:   Assessment Type: Assess only  General Appearance: Alert, Awake  Respiratory Pattern: Dyspnea with exertion  Chest Assessment: Chest expansion symmetrical  Bilateral Breath Sounds: Diminished  Cough: Non-productive, Strong  O2 Device: RA    Vitals:  Blood pressure 93/62, pulse 79, temperature (!) 97.2 °F (36.2 °C), resp. rate 18, weight 77 kg (169 lb 12.1 oz), SpO2 93%.          Imaging and other studies: I have personally reviewed pertinent reports.      O2 Device: RA     Plan    Respiratory  Plan: Home Bronchodilator Patient pathway        Resp Comments: Pr admitted due to pneumonia. Pt states he has been feeling worse since taking medication for treating Covid. Pt had a positive covid test 3 weeks ago and has been feeling ill for some time. Pt has hx of asthma and rarely uses home PRN inhaler except the past few days. He does use a home maintenece inhaler daily. C/o SOB however not showing signs of WOB during assesment, able to hold conversation with no to minimal breaks. Will cont with current medications at this time.

## 2024-09-08 NOTE — ASSESSMENT & PLAN NOTE
Presented to ED with worsening shortness of breath and cough over the past few days  Diagnosed with COVID 3 weeks ago and has completed a course of Paxlovid, had persistent body aches, fatigue and cough -now with productive cough and green sputum  Started on Augmentin by his PCP, worsening symptoms    CT chest with multifocal pneumonia greater on the right and greatest in the dependent lower lobes -concerning for superimposed bacterial pneumonia or aspiration  Urine strep and legionella ordered, Sputum culture ordered  Blood Culture pending  Lab Results   Component Value Date    WBC 7.40 09/08/2024    PROCALCITONI 0.29 (H) 11/11/2023     Trend fever curve  Initially given ceftriaxone in the emergency department, had hives and antibiotics discontinued -marked as allergy  Will continue with Levaquin instead  Appreciate speech therapy consult -patient has longstanding history of aspiration  Video swallow study ordered

## 2024-09-08 NOTE — ED PROVIDER NOTES
"History  Chief Complaint   Patient presents with    Shortness of Breath     Pt states he had covid on . Took paxlovid but then had rebound symptoms. States he was put on augmentin by . States increase in sob and productive cough. Home pulse ox was 90,91% this morning with chest pressure.      The patient is an 84-year-old male with a past medical history of asthma who presents with a complaint of worsening shortness of breath and cough over the last few days.  Patient states that approximately 3 weeks ago he was diagnosed with COVID and completed a course of Paxlovid.  He states that he had persistent bodyaches fatigue and cough.  He states that within the last week he started to have a productive cough with green sputum production and was seen by his primary care physician and placed on Augmentin.  States that within the last 2 to 3 days he has felt more short of breath and felt \"tight in his chest.  He has been using his albuterol inhaler more often.  States that he has had not needed to use his albuterol inhaler for many years.  He is on a daily maintenance inhaler otherwise.  He states that 3 days ago he did have a temperature 101.  He states that he has not had any fever last night or today.  Denies any nausea vomiting diarrhea abdominal pain.  Is a non-smoker.  Denies any recent travel.          Prior to Admission Medications   Prescriptions Last Dose Informant Patient Reported? Taking?   Fluticasone Furoate-Vilanterol 100-25 mcg/actuation inhaler 2024  Yes Yes   Sig: Inhale 1 puff daily   fluticasone (FLONASE) 50 mcg/act nasal spray 2024  Yes Yes   Si sprays into each nostril daily   simvastatin (ZOCOR) 40 mg tablet 2024  Yes Yes   Sig: Take 40 mg by mouth daily at bedtime   tamsulosin (FLOMAX) 0.4 mg 2024  Yes Yes   Sig: Take 0.4 mg by mouth in the morning      Facility-Administered Medications: None       Past Medical History:   Diagnosis Date    Asthma     Hyperlipidemia     " Urinary retention        Past Surgical History:   Procedure Laterality Date    HERNIA REPAIR      REPLACEMENT TOTAL KNEE BILATERAL      SHOULDER SURGERY Right     TONSILLECTOMY         History reviewed. No pertinent family history.  I have reviewed and agree with the history as documented.    E-Cigarette/Vaping    E-Cigarette Use Never User      E-Cigarette/Vaping Substances     Social History     Tobacco Use    Smoking status: Never    Smokeless tobacco: Never   Vaping Use    Vaping status: Never Used   Substance Use Topics    Alcohol use: Not Currently    Drug use: Never       Review of Systems   All other systems reviewed and are negative.      Physical Exam  Physical Exam  Vitals and nursing note reviewed.   Constitutional:       General: He is in acute distress.      Appearance: He is well-developed.   HENT:      Head: Normocephalic and atraumatic.      Mouth/Throat:      Mouth: Oropharynx is clear and moist.   Eyes:      Extraocular Movements: Extraocular movements intact and EOM normal.      Pupils: Pupils are equal, round, and reactive to light.   Cardiovascular:      Rate and Rhythm: Normal rate and regular rhythm.      Heart sounds: No murmur heard.  Pulmonary:      Effort: Pulmonary effort is normal. No respiratory distress.      Breath sounds: Decreased breath sounds present.   Abdominal:      General: Bowel sounds are normal.      Palpations: Abdomen is soft.      Tenderness: There is no abdominal tenderness.   Musculoskeletal:      Cervical back: Normal range of motion.      Right lower leg: No edema.      Left lower leg: No edema.   Skin:     General: Skin is warm and dry.      Capillary Refill: Capillary refill takes less than 2 seconds.   Neurological:      Mental Status: He is alert and oriented to person, place, and time.   Psychiatric:         Mood and Affect: Mood and affect normal.         Behavior: Behavior normal.         Vital Signs  ED Triage Vitals   Temperature Pulse Respirations Blood  Pressure SpO2   09/08/24 0845 09/08/24 0845 09/08/24 0845 09/08/24 0847 09/08/24 0845   98.5 °F (36.9 °C) 98 (!) 23 153/77 94 %      Temp Source Heart Rate Source Patient Position - Orthostatic VS BP Location FiO2 (%)   09/08/24 0845 09/08/24 0945 -- 09/08/24 0930 --   Temporal Monitor  Right arm       Pain Score       09/08/24 0845       8           Vitals:    09/08/24 1015 09/08/24 1045 09/08/24 1145 09/08/24 1200   BP: 127/65 126/58 120/67 113/59   Pulse: 76 88 82 78         Visual Acuity      ED Medications  Medications   albuterol inhalation solution 10 mg (10 mg Nebulization Given 9/8/24 0924)   ipratropium (ATROVENT) 0.02 % inhalation solution 1 mg (1 mg Nebulization Given 9/8/24 0924)   sodium chloride 0.9 % inhalation solution 12 mL (12 mL Nebulization Given 9/8/24 0925)   methylPREDNISolone sodium succinate (Solu-MEDROL) injection 125 mg (125 mg Intravenous Given 9/8/24 0917)   cefTRIAXone (ROCEPHIN) IVPB (premix in dextrose) 1,000 mg 50 mL (0 mg Intravenous Stopped 9/8/24 0933)   sodium chloride 0.9 % bolus 500 mL (0 mL Intravenous Stopped 9/8/24 1049)   diphenhydrAMINE (BENADRYL) injection 50 mg (50 mg Intravenous Given 9/8/24 0937)   ampicillin-sulbactam (UNASYN) 3 g in sodium chloride 0.9 % 100 mL IVPB (3 g Intravenous New Bag 9/8/24 1223)       Diagnostic Studies  Results Reviewed       Procedure Component Value Units Date/Time    Procalcitonin [[id number]] Collected: 09/08/24 0850    Lab Status: In process Specimen: Blood from Arm, Left Updated: 09/08/24 1236    Lactic acid, plasma (w/reflex if result > 2.0) [[id number]]  (Abnormal) Collected: 09/08/24 0916    Lab Status: Final result Specimen: Blood from Arm, Left Updated: 09/08/24 0944     LACTIC ACID 2.3 mmol/L     Narrative:      Result may be elevated if tourniquet was used during collection.    Blood culture #1 [[id number]] Collected: 09/08/24 0916    Lab Status: In process Specimen: Blood from Arm, Left Updated: 09/08/24 0923    HS Troponin 0hr  (reflex protocol) [[id number]]  (Normal) Collected: 09/08/24 0850    Lab Status: Final result Specimen: Blood from Arm, Left Updated: 09/08/24 0920     hs TnI 0hr 4 ng/L     Comprehensive metabolic panel [[id number]]  (Abnormal) Collected: 09/08/24 0850    Lab Status: Final result Specimen: Blood from Arm, Left Updated: 09/08/24 0913     Sodium 138 mmol/L      Potassium 4.1 mmol/L      Chloride 101 mmol/L      CO2 29 mmol/L      ANION GAP 8 mmol/L      BUN 13 mg/dL      Creatinine 0.85 mg/dL      Glucose 134 mg/dL      Calcium 9.8 mg/dL      AST 36 U/L      ALT 64 U/L      Alkaline Phosphatase 130 U/L      Total Protein 8.3 g/dL      Albumin 4.0 g/dL      Total Bilirubin 0.58 mg/dL      eGFR 79 ml/min/1.73sq m     Narrative:      National Kid[employer] Foundation guidelines for Chronic Kidney Disease (CKD):     Stage 1 with normal or high GFR (GFR > 90 mL/min/1.73 square meters)    Stage 2 Mild CKD (GFR = 60-89 mL/min/1.73 square meters)    Stage 3A Moderate CKD (GFR = 45-59 mL/min/1.73 square meters)    Stage 3B Moderate CKD (GFR = 30-44 mL/min/1.73 square meters)    Stage 4 Severe CKD (GFR = 15-29 mL/min/1.73 square meters)    Stage 5 End Stage CKD (GFR <15 mL/min/1.73 square meters)  Note: GFR calculation is accurate only with a steady state creatinine    CBC and differential [[id number]] Collected: 09/08/24 0850    Lab Status: Final result Specimen: Blood from Arm, Left Updated: 09/08/24 0856     WBC 7.40 Thousand/uL      RBC 4.54 Million/uL      Hemoglobin 14.0 g/dL      Hematocrit 42.6 %      MCV 94 fL      MCH 30.8 pg      MCHC 32.9 g/dL      RDW 12.6 %      MPV 9.3 fL      Platelets 318 Thousands/uL      nRBC 0 /100 WBCs      Segmented % 73 %      Immature Grans % 0 %      Lymphocytes % 20 %      Monocytes % 5 %      Eosinophils Relative 2 %      Basophils Relative 0 %      Absolute Neutrophils 5.44 Thousands/µL      Absolute Immature Grans 0.03 Thousand/uL      Absolute Lymphocytes 1.44 Thousands/µL       Absolute Monocytes 0.33 Thousand/µL      Eosinophils Absolute 0.13 Thousand/µL      Basophils Absolute 0.03 Thousands/µL                    CT chest without contrast   Final Result by Linda Toth MD (09/08 1141)      Multifocal pneumonia, greater on the right and greatest in the dependent lower lobes. The appearance is not typical for COVID-19 pneumonia and could be due to superimposed bacterial pneumonia or aspiration.      Increased conspicuity of multifocal hyperdensity in the lower lobes, question remote aspiration of barium or diffuse pulmonary ossification.         Workstation performed: [id number]         XR chest 1 view portable    (Results Pending)              Procedures  ECG 12 Lead Documentation Only    Date/Time: 9/8/2024 10:16 AM    Performed by: Fawad Sheldon PA-C  Authorized by: Fawad Sheldon PA-C    Indications / Diagnosis:  Sob  Rate:     ECG rate:  80    ECG rate assessment: normal    Rhythm:     Rhythm: sinus rhythm    Ectopy:     Ectopy: PVCs             ED Course  ED Course as of 09/08/24 1259   Sun Sep 08, 2024   0933 he began having hives to the Rocephin, started approximately 10 minutes after starting the Rocephin so stopped this antibiotic and given Benadryl already was given Solu-Medrol   1037 Increased breath sounds after neb                                 SBIRT 22yo+      Flowsheet Row Most Recent Value   Initial Alcohol Screen: US AUDIT-C     1. How often do you have a drink containing alcohol? 0 Filed at: 09/08/2024 0849   2. How many drinks containing alcohol do you have on a typical day you are drinking?  0 Filed at: 09/08/2024 0849   3a. Male UNDER 65: How often do you have five or more drinks on one occasion? 0 Filed at: 09/08/2024 0849   Audit-C Score 0 Filed at: 09/08/2024 0849   [place]: How many times in the past year have you...    Used an illegal drug or used a prescription medication for non-medical reasons? Never Filed at: 09/08/2024 0849       "                Medical Decision Making  The patient is an 84-year-old male with a past medical history of asthma who presents with a complaint of worsening shortness of breath and cough over the last few days.  Patient states that approximately 3 weeks ago he was diagnosed with COVID and completed a course of Paxlovid.  He states that he had persistent bodyaches fatigue and cough.  He states that within the last week he started to have a productive cough with green sputum production and was seen by his primary care physician and placed on Augmentin.  States that within the last 2 to 3 days he has felt more short of breath and felt \"tight in his chest.  He has been using his albuterol inhaler more often.  States that he has had not needed to use his albuterol inhaler for many years.  He is on a daily maintenance inhaler otherwise.  He states that 3 days ago he did have a temperature 101.  He states that he has not had any fever last night or today.  Denies any nausea vomiting diarrhea abdominal pain.  Is a non-smoker.  Denies any recent travel.    On examination had decreased breath sounds.  Has a history of asthma.  Did have increased breath sounds upon administration of heart neb.  Did have allergic reaction to Rocephin.  Was given Unasyn after the CAT scan showed multifocal pneumonia.  Patient ambulated and pulse ox showed 91 to 92% without any hypoxia.  Patient has been on the date for now of Augmentin.  The patient states that he has been feeling worsening shortness of breath unable to lay flat at home.  Discussed with the hospitalist service for admission secondary to his worsening symptoms multifocal pneumonia possible aspiration.  Patient agreement treatment plan and accepted to hospital service.  At risk for sepsis.    Differential diagnosis included but was not limited to :Pneumonia, Sinusitis, URI, ACS, asthma exacerbation      Amount and/or Complexity of Data Reviewed  Labs: ordered. Decision-making " details documented in ED Course.  Radiology: ordered and independent interpretation performed. Decision-making details documented in ED Course.  ECG/medicine tests: ordered and independent interpretation performed. Decision-making details documented in ED Course.    Risk  Prescription drug management.  Decision regarding hospitalization.                 Disposition  Final diagnoses:   SOB (shortness of breath)   Multifocal pneumonia     Time reflects when diagnosis was documented in both MDM as applicable and the Disposition within this note       Time User Action Codes Description Comment    9/8/2024 12:22 PM Fawad Sheldon [R06.02] SOB (shortness of breath)     9/8/2024 12:22 PM Fawad Sheldon [J18.9] Multifocal pneumonia           ED Disposition       ED Disposition   Admit    Condition   Stable    Date/Time   Sun Sep 8, 2024 1222    Comment   Case was discussed with radha and the patient's admission status was agreed to be Admission Status: inpatient status to the service of Dr. garcia .               Follow-up Information    None         Patient's Medications   Discharge Prescriptions    No medications on file       No discharge procedures on file.    PDMP Review       None            ED Provider  Electronically Signed by             Fawad Sheldon PA-C  09/08/24 1912

## 2024-09-08 NOTE — ED NOTES
Pt reports that he felt tingling and swelling above his eyes and tingling down the left flank. He also reports hives that are noted to be on face, neck and abdomen. IV antibiotic stopped at this time and pt is reporting that symptoms seem to be improving. He denies worsening in SOB and no tongue swelling at this time. Provider made aware.         Courtney Dos Santos RN  09/08/24 0977

## 2024-09-08 NOTE — ASSESSMENT & PLAN NOTE
Patient with asthma and is maintained on daily maintenance inhaler  Typically has not had to use his rescue albuterol inhaler in years, has been utilizing more frequently  Currently no wheezing on exam  Can continue his home inhaler as well as as needed nebulizer

## 2024-09-08 NOTE — H&P
[facility]  H&P  Name: James Ortiz 84 y.o. male I MRN: [id number]  Unit/Bed#: -01 I Date of Admission: 9/8/2024   Date of Service: 9/8/2024 I Hospital Day: 0      Assessment & Plan   * Multifocal pneumonia  Assessment & Plan  Presented to ED with worsening shortness of breath and cough over the past few days  Diagnosed with COVID 3 weeks ago and has completed a course of Paxlovid, had persistent body aches, fatigue and cough -now with productive cough and green sputum  Started on Augmentin by his PCP, worsening symptoms    CT chest with multifocal pneumonia greater on the right and greatest in the dependent lower lobes -concerning for superimposed bacterial pneumonia or aspiration  Urine strep and legionella ordered, Sputum culture ordered  Blood Culture pending  Lab Results   Component Value Date    WBC 7.40 09/08/2024    PROCALCITONI 0.29 (H) 11/11/2023     Trend fever curve  Initially given ceftriaxone in the emergency department, had hives and antibiotics discontinued -marked as allergy  Will continue with Levaquin instead  Appreciate speech therapy consult -patient has longstanding history of aspiration  Video swallow study ordered    Elevated lactic acid level  Assessment & Plan  Mildly elevated lactic acid in setting of pneumonia and dehydration  Given IV fluid hydration  Does not meet sepsis criteria    Asthma  Assessment & Plan  Patient with asthma and is maintained on daily maintenance inhaler  Typically has not had to use his rescue albuterol inhaler in years, has been utilizing more frequently  Currently no wheezing on exam  Can continue his home inhaler as well as as needed nebulizer           VTE Pharmacologic Prophylaxis: VTE Score: 4 Moderate Risk (Score 3-4) - Pharmacological DVT Prophylaxis Ordered: heparin.  Code Status: Level 1 - Full Code   Discussion with family: Updated  (wife) at bedside.    Anticipated Length of Stay: Patient will be admitted  on an inpatient basis with an anticipated length of stay of greater than 2 midnights secondary to multifocal pneumonia.    Total Time Spent on Date of Encounter in care of patient:  mins. This time was spent on one or more of the following: performing physical exam; counseling and coordination of care; obtaining or reviewing history; documenting in the medical record; reviewing/ordering tests, medications or procedures; communicating with other healthcare professionals and discussing with patient's family/caregivers.    Chief Complaint:   Chief Complaint   Patient presents with    Shortness of Breath     Pt states he had covid on 8/14. Took paxlovid but then had rebound symptoms. States he was put on augmentin by  States increase in sob and productive cough. Home pulse ox was 90,91% this morning with chest pressure.          History of Present Illness:  James Ortiz is a 84 y.o. male with a PMH of asthma, chronic cervical pain, previous urinary retention associated with BPH who presents with concern for continuous shortness of breath and productive cough.  Was diagnosed with COVID 3 weeks ago and has completed a course of Paxlovid.  Had rebound symptoms and was short of breath with productive cough and dark green sputum.  His PCP started him on Augmentin 9/4 but has had progressive worsening of symptoms, with chest tightness as well as home pulse ox with him being 90 to 91% on room air.  Has been having intermittent fevers up to 101.  States for many years she has been struggling with aspiration issues, typically can avoid much of an issue with being picky over what he eats.  Is not on any specific diet.     Review of Systems:  Review of Systems   Constitutional:  Positive for fatigue and fever. Negative for activity change and chills.   HENT:  Negative for congestion, rhinorrhea and sore throat.    Eyes:  Negative for visual disturbance.   Respiratory:  Positive for cough and chest tightness. Negative for  shortness of breath.    Cardiovascular:  Negative for chest pain and palpitations.   Gastrointestinal:  Negative for abdominal pain, constipation, diarrhea, nausea and vomiting.   Genitourinary:  Negative for difficulty urinating, dysuria, frequency and urgency.   Musculoskeletal:  Negative for arthralgias and myalgias.   Skin:  Negative for rash.   Neurological:  Negative for seizures, syncope, weakness and headaches.   All other systems reviewed and are negative.      Past Medical and Surgical History:   Past Medical History:   Diagnosis Date    Asthma     Hyperlipidemia     Urinary retention        Past Surgical History:   Procedure Laterality Date    HERNIA REPAIR      REPLACEMENT TOTAL KNEE BILATERAL      SHOULDER SURGERY Right     TONSILLECTOMY         Meds/Allergies:  Prior to Admission medications    Medication Sig Start Date End Date Taking? Authorizing Provider   fluticasone (FLONASE) 50 mcg/act nasal spray 2 sprays into each nostril daily    Historical Provider, MD   Fluticasone Furoate-Vilanterol 100-25 mcg/actuation inhaler Inhale 1 puff daily    Historical Provider, MD   simvastatin (ZOCOR) 40 mg tablet Take 40 mg by mouth daily at bedtime 7/24/23   Historical Provider, MD   tamsulosin (FLOMAX) 0.4 mg Take 0.4 mg by mouth daily with dinner    Historical Provider, MD     I have reviewed home medications with patient personally.    Allergies:   Allergies   Allergen Reactions    Shellfish-Derived Products - Food Allergy Anaphylaxis    Rocephin [Ceftriaxone] Hives     Pt reported hives, tingling and feeling like his eye were swelling with this medication       Social History:  Marital Status: /Civil Union   Occupation: None  Patient Pre-hospital Living Situation: Home  Patient Pre-hospital Level of Mobility: unable to be assessed at time of evaluation  Patient Pre-hospital Diet Restrictions: None  Substance Use History:   Social History     Substance and Sexual Activity   Alcohol Use Not Currently      Social History     Tobacco Use   Smoking Status Never   Smokeless Tobacco Never     Social History     Substance and Sexual Activity   Drug Use Never       Family History:  History reviewed. No pertinent family history.    Physical Exam:     Vitals:   Blood Pressure: 93/62 (09/08/24 1306)  Pulse: 79 (09/08/24 1306)  Temperature: (!) 97.2 °F (36.2 °C) (09/08/24 1306)  Temp Source: Temporal (09/08/24 0845)  Respirations: 18 (09/08/24 1306)  Weight - Scale: 77 kg (169 lb 12.1 oz) (09/08/24 0845)  SpO2: 93 % (09/08/24 1306)    Physical Exam  Vitals and nursing note reviewed.   Constitutional:       General: He is not in acute distress.     Appearance: Normal appearance. He is ill-appearing.   HENT:      Head: Normocephalic and atraumatic.      Nose: No congestion.      Mouth/Throat:      Mouth: Mucous membranes are dry.   Eyes:      Conjunctiva/sclera: Conjunctivae normal.   Cardiovascular:      Rate and Rhythm: Normal rate and regular rhythm.      Pulses: Normal pulses.      Heart sounds: Normal heart sounds. No murmur heard.  Pulmonary:      Effort: Pulmonary effort is normal. No respiratory distress.      Comments: Diminished breath sounds   Abdominal:      General: Bowel sounds are normal.      Palpations: Abdomen is soft.      Tenderness: There is no abdominal tenderness.   Musculoskeletal:         General: Normal range of motion.      Right lower leg: No edema.      Left lower leg: No edema.   Skin:     General: Skin is warm and dry.   Neurological:      Mental Status: He is alert and oriented to person, place, and time.          Additional Data:     Lab Results:  Results from last 7 days   Lab Units 09/08/24  0850   WBC Thousand/uL 7.40   HEMOGLOBIN g/dL 14.0   HEMATOCRIT % 42.6   PLATELETS Thousands/uL 318   SEGS PCT % 73   LYMPHO PCT % 20   MONO PCT % 5   EOS PCT % 2     Results from last 7 days   Lab Units 09/08/24  0850   SODIUM mmol/L 138   POTASSIUM mmol/L 4.1   CHLORIDE mmol/L 101   CO2 mmol/L 29   BUN  "mg/dL 13   CREATININE mg/dL 0.85   ANION GAP mmol/L 8   CALCIUM mg/dL 9.8   ALBUMIN g/dL 4.0   TOTAL BILIRUBIN mg/dL 0.58   ALK PHOS U/L 130*   ALT U/L 64*   AST U/L 36   GLUCOSE RANDOM mg/dL 134             No results found for: \"HGBA1C\"  Results from last 7 days   Lab Units 09/08/24  0916 09/08/24  0850   LACTIC ACID mmol/L 2.3*  --    PROCALCITONIN ng/ml  --  <0.05       Lines/Drains:  Invasive Devices       Peripheral Intravenous Line  Duration             Peripheral IV 09/08/24 Left Antecubital <1 day                        Imaging: Reviewed radiology reports from this admission including: chest CT scan  CT chest without contrast   Final Result by Linda Toth MD (09/08 1141)      Multifocal pneumonia, greater on the right and greatest in the dependent lower lobes. The appearance is not typical for COVID-19 pneumonia and could be due to superimposed bacterial pneumonia or aspiration.      Increased conspicuity of multifocal hyperdensity in the lower lobes, question remote aspiration of barium or diffuse pulmonary ossification.         Workstation performed: [id number]         XR chest 1 view portable    (Results Pending)   FL barium swallow video w speech    (Results Pending)       EKG and Other Studies Reviewed on Admission:   EKG: Personally Reviewed. NSR. HR 80 with PVCs.    ** Please Note: This note has been constructed using a voice recognition system. **    "

## 2024-09-08 NOTE — ASSESSMENT & PLAN NOTE
Mildly elevated lactic acid in setting of pneumonia and dehydration  Given IV fluid hydration  Does not meet sepsis criteria

## 2024-09-08 NOTE — PLAN OF CARE
Problem: PAIN - ADULT  Goal: Verbalizes/displays adequate comfort level or baseline comfort level  Description: Interventions:  - Encourage patient to monitor pain and request assistance  - Assess pain using appropriate pain scale  - Administer analgesics based on type and severity of pain and evaluate response  - Implement non-pharmacological measures as appropriate and evaluate response  - Consider cultural and social influences on pain and pain management  - Notify physician/advanced practitioner if interventions unsuccessful or patient reports new pain  Outcome: Progressing     Problem: INFECTION - ADULT  Goal: Absence or prevention of progression during hospitalization  Description: INTERVENTIONS:  - Assess and monitor for signs and symptoms of infection  - Monitor lab/diagnostic results  - Monitor all insertion sites, i.e. indwelling lines, tubes, and drains  - Monitor endotracheal if appropriate and nasal secretions for changes in amount and color  - [place] appropriate cooling/warming therapies per order  - Administer medications as ordered  - Instruct and encourage patient and family to use good hand hygiene technique  - Identify and instruct in appropriate isolation precautions for identified infection/condition  Outcome: Progressing  Goal: Absence of fever/infection during neutropenic period  Description: INTERVENTIONS:  - Monitor WBC    Outcome: Progressing     Problem: SAFETY ADULT  Goal: Patient will remain free of falls  Description: INTERVENTIONS:  - Educate patient/family on patient safety including physical limitations  - Instruct patient to call for assistance with activity   - Consult OT/PT to assist with strengthening/mobility   - Keep Call bell within reach  - Keep bed low and locked with side rails adjusted as appropriate  - Keep care items and personal belongings within reach  - Initiate and maintain comfort rounds  - Make Fall Risk Sign visible to staff  - Offer Toileting every 2 Hours,  in advance of need  - Initiate/Maintain bed alarm  - Obtain necessary fall risk management equipment: yellow socks, bracelet  - Apply yellow socks and bracelet for high fall risk patients  - Consider moving patient to room near nurses station  Outcome: Progressing  Goal: Maintain or return to baseline ADL function  Description: INTERVENTIONS:  -  Assess patient's ability to carry out ADLs; assess patient's baseline for ADL function and identify physical deficits which impact ability to perform ADLs (bathing, care of mouth/teeth, toileting, grooming, dressing, etc.)  - Assess/evaluate cause of self-care deficits   - Assess range of motion  - Assess patient's mobility; develop plan if impaired  - Assess patient's need for assistive devices and provide as appropriate  - Encourage maximum independence but intervene and supervise when necessary  - Involve family in performance of ADLs  - Assess for home care needs following discharge   - Consider OT consult to assist with ADL evaluation and planning for discharge  - Provide patient education as appropriate  Outcome: Progressing  Goal: Maintains/Returns to pre admission functional level  Description: INTERVENTIONS:  - Perform AM-PAC 6 Click Basic Mobility/ Daily Activity assessment daily.  - Set and communicate daily mobility goal to care team and patient/family/caregiver.   - Collaborate with rehabilitation services on mobility goals if consulted  - Perform Range of Motion 3 times a day.  - Reposition patient every 2 hours.  - Dangle patient 2 times a day  - Stand patient 2 times a day  - Ambulate patient 2 times a day  - Out of bed to chair 2 times a day   - Out of bed for meals 2 times a day  - Out of bed for toileting  - Record patient progress and toleration of activity level   Outcome: Progressing     Problem: DISCHARGE PLANNING  Goal: Discharge to home or other facility with appropriate resources  Description: INTERVENTIONS:  - Identify barriers to discharge  w/patient and caregiver  - Arrange for needed discharge resources and transportation as appropriate  - Identify discharge learning needs (meds, wound care, etc.)  - Arrange for interpretive services to assist at discharge as needed  - Refer to Case Management Department for coordinating discharge planning if the patient needs post-hospital services based on physician/advanced practitioner order or complex needs related to functional status, cognitive ability, or social support system  Outcome: Progressing     Problem: Knowledge Deficit  Goal: Patient/family/caregiver demonstrates understanding of disease process, treatment plan, medications, and discharge instructions  Description: Complete learning assessment and assess knowledge base.  Interventions:  - Provide teaching at level of understanding  - Provide teaching via preferred learning methods  Outcome: Progressing

## 2024-09-09 ENCOUNTER — APPOINTMENT (INPATIENT)
Dept: RADIOLOGY | Facility: HOSPITAL | Age: 84
DRG: 195 | End: 2024-09-09
Payer: MEDICARE

## 2024-09-09 LAB
ANION GAP SERPL CALCULATED.3IONS-SCNC: 7 MMOL/L (ref 4–13)
ATRIAL RATE: 80 BPM
BUN SERPL-MCNC: 17 MG/DL (ref 5–25)
CALCIUM SERPL-MCNC: 8.9 MG/DL (ref 8.4–10.2)
CHLORIDE SERPL-SCNC: 108 MMOL/L (ref 96–108)
CO2 SERPL-SCNC: 23 MMOL/L (ref 21–32)
CREAT SERPL-MCNC: 0.65 MG/DL (ref 0.6–1.3)
ERYTHROCYTE [DISTWIDTH] IN BLOOD BY AUTOMATED COUNT: 12.6 % (ref 11.6–15.1)
GFR SERPL CREATININE-BSD FRML MDRD: 88 ML/MIN/1.73SQ M
GLUCOSE SERPL-MCNC: 105 MG/DL (ref 65–140)
HCT VFR BLD AUTO: 35.1 % (ref 36.5–49.3)
HGB BLD-MCNC: 11.7 G/DL (ref 12–17)
L PNEUMO1 AG UR QL IA.RAPID: NEGATIVE
MCH RBC QN AUTO: 31.1 PG (ref 26.8–34.3)
MCHC RBC AUTO-ENTMCNC: 33.3 G/DL (ref 31.4–37.4)
MCV RBC AUTO: 93 FL (ref 82–98)
PLATELET # BLD AUTO: 317 THOUSANDS/UL (ref 149–390)
PMV BLD AUTO: 9.4 FL (ref 8.9–12.7)
POTASSIUM SERPL-SCNC: 4.4 MMOL/L (ref 3.5–5.3)
PR INTERVAL: 184 MS
PROCALCITONIN SERPL-MCNC: <0.05 NG/ML
QRS AXIS: 114 DEGREES
QRSD INTERVAL: 78 MS
QT INTERVAL: 344 MS
QTC INTERVAL: 396 MS
RBC # BLD AUTO: 3.76 MILLION/UL (ref 3.88–5.62)
S PNEUM AG UR QL: NEGATIVE
SODIUM SERPL-SCNC: 138 MMOL/L (ref 135–147)
T WAVE AXIS: 125 DEGREES
VENTRICULAR RATE: 80 BPM
WBC # BLD AUTO: 12.06 THOUSAND/UL (ref 4.31–10.16)

## 2024-09-09 PROCEDURE — 92611 MOTION FLUOROSCOPY/SWALLOW: CPT

## 2024-09-09 PROCEDURE — 92610 EVALUATE SWALLOWING FUNCTION: CPT

## 2024-09-09 PROCEDURE — 84145 PROCALCITONIN (PCT): CPT

## 2024-09-09 PROCEDURE — 93010 ELECTROCARDIOGRAM REPORT: CPT | Performed by: INTERNAL MEDICINE

## 2024-09-09 PROCEDURE — 80048 BASIC METABOLIC PNL TOTAL CA: CPT

## 2024-09-09 PROCEDURE — 99232 SBSQ HOSP IP/OBS MODERATE 35: CPT

## 2024-09-09 PROCEDURE — 85027 COMPLETE CBC AUTOMATED: CPT

## 2024-09-09 PROCEDURE — 74230 X-RAY XM SWLNG FUNCJ C+: CPT

## 2024-09-09 RX ADMIN — GUAIFENESIN 600 MG: 600 TABLET ORAL at 17:28

## 2024-09-09 RX ADMIN — AMPICILLIN SODIUM AND SULBACTAM SODIUM 3 G: 100; 50 INJECTION, POWDER, FOR SOLUTION INTRAVENOUS at 18:48

## 2024-09-09 RX ADMIN — TAMSULOSIN HYDROCHLORIDE 0.4 MG: 0.4 CAPSULE ORAL at 07:55

## 2024-09-09 RX ADMIN — PRAVASTATIN SODIUM 80 MG: 80 TABLET ORAL at 17:28

## 2024-09-09 RX ADMIN — SODIUM CHLORIDE, SODIUM GLUCONATE, SODIUM ACETATE, POTASSIUM CHLORIDE, MAGNESIUM CHLORIDE, SODIUM PHOSPHATE, DIBASIC, AND POTASSIUM PHOSPHATE 75 ML/HR: .53; .5; .37; .037; .03; .012; .00082 INJECTION, SOLUTION INTRAVENOUS at 07:57

## 2024-09-09 RX ADMIN — HEPARIN SODIUM 5000 UNITS: 5000 INJECTION INTRAVENOUS; SUBCUTANEOUS at 21:15

## 2024-09-09 RX ADMIN — HEPARIN SODIUM 5000 UNITS: 5000 INJECTION INTRAVENOUS; SUBCUTANEOUS at 05:09

## 2024-09-09 RX ADMIN — GUAIFENESIN 600 MG: 600 TABLET ORAL at 07:55

## 2024-09-09 RX ADMIN — FLUTICASONE PROPIONATE 2 SPRAY: 50 SPRAY, METERED NASAL at 07:55

## 2024-09-09 RX ADMIN — FLUTICASONE FUROATE AND VILANTEROL TRIFENATATE 1 PUFF: 100; 25 POWDER RESPIRATORY (INHALATION) at 07:55

## 2024-09-09 RX ADMIN — AMPICILLIN SODIUM AND SULBACTAM SODIUM 3 G: 100; 50 INJECTION, POWDER, FOR SOLUTION INTRAVENOUS at 05:30

## 2024-09-09 RX ADMIN — AMPICILLIN SODIUM AND SULBACTAM SODIUM 3 G: 100; 50 INJECTION, POWDER, FOR SOLUTION INTRAVENOUS at 11:55

## 2024-09-09 RX ADMIN — AMPICILLIN SODIUM AND SULBACTAM SODIUM 3 G: 100; 50 INJECTION, POWDER, FOR SOLUTION INTRAVENOUS at 00:22

## 2024-09-09 RX ADMIN — HEPARIN SODIUM 5000 UNITS: 5000 INJECTION INTRAVENOUS; SUBCUTANEOUS at 17:28

## 2024-09-09 NOTE — SPEECH THERAPY NOTE
Speech Language/Pathology  Speech-Language Pathology Bedside Swallow Evaluation      Patient Name: James Ortiz    Today's Date: 9/9/2024    Summary   Consult received for bedside swallow assessment and VBS. Pt admitted w/ multifocal PNA. PMHx includes asthma and covid ~2 weeks ago. Pt reports long hx of dysphagia and esophageal dilations. Underwent repair of hiatal hernia, partial fundoplication, and esophageal dilation with [facility] in July of 2016. Reports now he has to be careful with meats and larger pills but overall denies dysphagia. Previously had VBS completed but results unavailable, pt reports he was never on thickened liquids or altered diet.   Seen w/ breakfast meal consisting of toast, sausage lilian, potatoes and thins via cup and straw. Pt demonstrates prolonged mastication with solids. Swallow initiation appears effortful but prompt. Pt denies odynophagia. Delayed cough x2 during solids. Pt reporting he coughs after eating at times. Denies hx of PNA in the last few years. Agreeable to VBS this afternoon.    Risk/s for Aspiration: Moderate-high     Recommended Diet: regular diet and thin liquids   Recommended Form of Meds: whole with liquid   Aspiration precautions and swallowing strategies: upright posture  Other Recommendations: Continue frequent oral care        Current Medical Status  James Ortiz is a 84 y.o. male with a PMH of asthma, chronic cervical pain, previous urinary retention associated with BPH who presents with concern for continuous shortness of breath and productive cough.  Was diagnosed with COVID 3 weeks ago and has completed a course of Paxlovid.  Had rebound symptoms and was short of breath with productive cough and dark green sputum.  His PCP started him on Augmentin 9/4 but has had progressive worsening of symptoms, with chest tightness as well as home pulse ox with him being 90 to 91% on room air.  Has been having intermittent fevers up to 101.  States for many years she  has been struggling with aspiration issues, typically can avoid much of an issue with being picky over what he eats.  Is not on any specific diet.     Current Precautions:  Fall  Aspiration      Allergies:  No known food allergies    Past medical history:  Please see H&P for details    Special Studies:  CT Chest:  Multifocal pneumonia, greater on the right and greatest in the dependent lower lobes. The appearance is not typical for COVID-19 pneumonia and could be due to superimposed bacterial pneumonia or aspiration.  Increased conspicuity of multifocal hyperdensity in the lower lobes, question remote aspiration of barium or diffuse pulmonary ossification.     Social/Education/Vocational Hx:  Pt lives with family    Swallow Information   Current Risks for Dysphagia & Aspiration: known history of dysphagia and known history of aspiration  Current Symptoms/Concerns: coughing with po and change in respiratory status  Current Diet: regular diet and thin liquids   Baseline Diet: regular diet and thin liquids      Baseline Assessment   Behavior/Cognition: alert  Speech/Language Status: able to participate in conversation  Patient Positioning: upright in bed  Pain Status/Interventions/Response to Interventions:   No report of or nonverbal indications of pain.       Swallow Mechanism Exam  Facial: symmetrical  Labial: WFL  Lingual: WFL  Velum: symmetrical  Mandible: adequate ROM  Dentition: adequate  Vocal quality:clear/adequate   Volitional Cough: strong/productive   Respiratory Status: on RA     Consistencies Assessed and Performance   Consistencies Administered: thin liquids, soft solids, and hard solids    Oral Stage: mild  Mastication was prolonged with the materials administered today.  Bolus formation and transfer were functional with no significant oral residue noted.  No overt s/s reduced oral control.    Pharyngeal Stage: suspected  Swallow Mechanics:  Swallowing initiation appeared prompt.  Laryngeal rise was  palpated and judged to be within functional limits. Delayed coughing with solids    Esophageal Concerns: belching    Summary and Recommendations (see above)    Results Reviewed with: patient and RN     Treatment Recommended: VBS for further assessment of pharyngeal phase of swallow     Frequency of treatment: TBD    Dysphagia LTG  -Patient will demonstrate safe and effective oral intake (without overt s/s significant oral/pharyngeal dysphagia including s/s penetration or aspiration) for the highest appropriate diet level.     Short Term Goals:    -Pt will tolerate regular and thin liquid with no significant s/s oral or pharyngeal dysphagia across 1-3 diagnostic session/s.    -Patient will comply with a Video/Modified Barium Swallow study for more complete assessment of swallowing anatomy/physiology/aspiration risk and to assess efficacy of treatment techniques so as to best guide treatment plan          Speech Therapy Prognosis   Prognosis: good    Prognosis Considerations: cognitive status    Iwona Martin MS CCC-SLP  9/9/2024

## 2024-09-09 NOTE — DISCHARGE INSTR - DIET
Regular/thin liquids- avoid items with casing and nuts/seeds  Meds whole as puree    VBS completed 9/9/24:  General Information;  Pt is a 84 y.o. male with a PMH remarkable for hernia repair, frequent EGDs w/ dilation, urinary retention, asthma, covid ~2 weeks ago  .    Current concerns for dysphagia include multifocal PNA and hx of dysphagia.      A VFSS was recommended to assess oropharyngeal stage swallowing skills at this time. Pt was viewed in lateral position and assessed with thin liquid (by teaspoon, single and successive cup/straw sips), puree, soft moist food (sliced banana) and solid food (sandwich and cracker) and a13mm pill with thin liquid.      Oral stage:  Pt presented with WFL oral stage of swallow    Lip closure:  no escape  Mastication: timely and efficient    Bolus Transport/Lingual Motion: brisk  Oral residue:   at least mild residue on oral structures, major of bolus remaining, minimal to no clearance  Tongue Control:  posterior escape of  greater than half of the bolus  Swallow Initiation: bolus head at posterior laryngeal surface of epiglottis    Pharyngeal stage:  Pt presented with mild pharyngeal dysphagia.     Soft palate elevation:  no bolus between soft palate and pharyngeal wall   Laryngeal elevation: complete     Anterior hyoid excursion:    partial    Epiglottic movement:  complete    Laryngeal vestibule closure:   complete    Tongue base retraction:   narrow column of contrast/air between TB and PW  Pharyngeal Stripping:  diminished   Pharyngeal Contraction:  incomplete     PES opening:    minimal distention or duration with marked obstruction of flow  Pharyngeal Residue: Mild-moderate retention in valleculae and pyriform sinus. Intermittent retrograde mvmt of trace amt of bolus from PES due to significant CP bar    Management of food/liquid/barium tablet follows:   No aspiration on study  Transient penetration of thins via straw (successive sips) which was expelled from airway  within the same swallow without sensory response.  Prominent CP bar which likely contributes to coughing and expelling food particles after meals     Penetration/Aspiration:  Thin: PAS - 2  Puree: PAS- 1  Solid: PAS- 1  Response to Aspiration: N/A           8-Point Penetration-Aspiration Scale   1 Material does not enter the airway   2 Material enters the airway, remains above the vocal folds, and is ejected  from the  airway    3 Material enters the airway, remains above the vocal folds, and is not ejected from the airway   4 Material enters the airway, contacts the vocal folds, and is ejected from the airway   5 Material enters the airway, contacts the vocal folds, and is not ejected from the airway    6 Material enters the airway, passes below the vocal folds and is ejected into the larynx or out of the airway    7 Material enters the airway, passes below the vocal folds, and is not ejected from the trachea despite effort    8 Material enters the airway, passes below the vocal folds, and no effort is made to eject         Strategies and Efficacy: N/A    Aspiration Response and Efficacy:  N/A    Esophageal stage:  Brief view of esophagus was completed.  Re: esophageal clearance, complete clearance noted        Assessment Summary:    Pt presents with mild oropharyngeal dysphagia characterized by decreased laryngeal excursion but adequate elevation and airway protection. Significant CP bar noted which obstructed bolus flow and resulted in trace retrograde movement to pyriforms.  No aspiration on study.  Transient penetration with successive sips of thins via straw, expelled within same swallow without sensory response.  Mild-moderate pharyngeal retention with solids secondary to impaired pharyngeal squeeze.   See above for information on prominent CP bar.      Note: Images are available for review in PACS as desired.      Recommendations:   Recommended Diet:  regular diet and thin liquids   Recommended Form of  Medications: whole with liquid   Aspiration precautions and compensatory swallowing strategies: upright posture and alternating bites and sips  Consider referral to:  ENT d/t prominent CP bar  SLP Dysphagia therapy recommended: None at this time    Results Reviewed with: patient and RN   Pt/Family Education: Completed. 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

## 2024-09-09 NOTE — PROGRESS NOTES
[facility]  Progress Note  Name: James Ortiz I  MRN: [id number]  Unit/Bed#: -01 I Date of Admission: 9/8/2024   Date of Service: 9/9/2024 I Hospital Day: 1    Assessment & Plan   * Multifocal pneumonia  Assessment & Plan  Presented to ED with worsening shortness of breath and cough over the past few days  Diagnosed with COVID 3 weeks ago and has completed a course of Paxlovid, had persistent body aches, fatigue and cough -now with productive cough and green sputum  Started on Augmentin by his PCP, worsening symptoms    CT chest with multifocal pneumonia greater on the right and greatest in the dependent lower lobes -concerning for superimposed bacterial pneumonia or aspiration  Urine strep and legionella ordered, Sputum culture ordered  Blood Culture pending  Lab Results   Component Value Date    WBC 12.06 (H) 09/09/2024    PROCALCITONI <0.05 09/09/2024     Trend fever curve  Initially given ceftriaxone in the emergency department, had hives and antibiotics discontinued -marked as allergy  Will continue with Unasyn  Appreciate speech therapy consult -patient has longstanding history of aspiration  Video swallow study ordered    Elevated lactic acid level  Assessment & Plan  Mildly elevated lactic acid in setting of pneumonia and dehydration  Given IV fluid hydration  Does not meet sepsis criteria    Asthma  Assessment & Plan  Patient with asthma and is maintained on daily maintenance inhaler  Typically has not had to use his rescue albuterol inhaler in years, has been utilizing more frequently  Currently no wheezing on exam  Can continue his home inhaler as well as as needed nebulizer             VTE Pharmacologic Prophylaxis: VTE Score: 4 Moderate Risk (Score 3-4) - Pharmacological DVT Prophylaxis Ordered: heparin.    Mobility:   Basic Mobility Inpatient Raw Score: 23  JH-HLM Goal: 7: Walk 25 feet or more  JH-HLM Achieved: 7: Walk 25 feet or more  JH-HLM Goal achieved. Continue  to encourage appropriate mobility.    Patient Centered Rounds: I performed bedside rounds with nursing staff today.   Discussions with Specialists or Other Care Team Provider: MARKEL    Education and Discussions with Family / Patient: Patient declined call to .     Total Time Spent on Date of Encounter in care of patient:  mins. This time was spent on one or more of the following: performing physical exam; counseling and coordination of care; obtaining or reviewing history; documenting in the medical record; reviewing/ordering tests, medications or procedures; communicating with other healthcare professionals and discussing with patient's family/caregivers.    Current Length of Stay: 1 day(s)  Current Patient Status: Inpatient   Certification Statement: The patient will continue to require additional inpatient hospital stay due to possible aspiration pneumonia   Discharge Plan: Anticipate discharge tomorrow to home.    Code Status: Level 1 - Full Code    Subjective:   Seen and examined.  Is feeling better, ambulating of around the room and feeling his breathing is improved with walking.  Is able to produce sputum sample and feels that mucus is mobilizing.  Cough remains    Objective:     Vitals:   Temp (24hrs), Av.2 °F (36.2 °C), Min:97.2 °F (36.2 °C), Max:97.3 °F (36.3 °C)    Temp:  [97.2 °F (36.2 °C)-97.3 °F (36.3 °C)] 97.3 °F (36.3 °C)  HR:  [55-92] 55  Resp:  [18] 18  BP: ()/(58-85) 107/61  SpO2:  [91 %-95 %] 95 %  Body mass index is 24.36 kg/m².     Input and Output Summary (last 24 hours):     Intake/Output Summary (Last 24 hours) at 2024 1023  Last data filed at 2024 0757  Gross per 24 hour   Intake 1410 ml   Output --   Net 1410 ml       Physical Exam:   Physical Exam  Vitals and nursing note reviewed.   Constitutional:       General: He is not in acute distress.     Appearance: Normal appearance.   HENT:      Head: Normocephalic and atraumatic.      Nose: No congestion.       Mouth/Throat:      Mouth: Mucous membranes are dry.   Eyes:      Conjunctiva/sclera: Conjunctivae normal.   Cardiovascular:      Rate and Rhythm: Normal rate and regular rhythm.      Pulses: Normal pulses.      Heart sounds: Normal heart sounds. No murmur heard.  Pulmonary:      Effort: Pulmonary effort is normal. No respiratory distress.      Breath sounds: Rhonchi present.   Abdominal:      General: Bowel sounds are normal.      Palpations: Abdomen is soft.      Tenderness: There is no abdominal tenderness.   Musculoskeletal:         General: Normal range of motion.      Right lower leg: No edema.      Left lower leg: No edema.   Skin:     General: Skin is warm and dry.   Neurological:      Mental Status: He is alert and oriented to person, place, and time.          Additional Data:     Labs:  Results from last 7 days   Lab Units 09/09/24  0509 09/08/24  0850   WBC Thousand/uL 12.06* 7.40   HEMOGLOBIN g/dL 11.7* 14.0   HEMATOCRIT % 35.1* 42.6   PLATELETS Thousands/uL 317 318   SEGS PCT %  --  73   LYMPHO PCT %  --  20   MONO PCT %  --  5   EOS PCT %  --  2     Results from last 7 days   Lab Units 09/09/24  0509 09/08/24  0850   SODIUM mmol/L 138 138   POTASSIUM mmol/L 4.4 4.1   CHLORIDE mmol/L 108 101   CO2 mmol/L 23 29   BUN mg/dL 17 13   CREATININE mg/dL 0.65 0.85   ANION GAP mmol/L 7 8   CALCIUM mg/dL 8.9 9.8   ALBUMIN g/dL  --  4.0   TOTAL BILIRUBIN mg/dL  --  0.58   ALK PHOS U/L  --  130*   ALT U/L  --  64*   AST U/L  --  36   GLUCOSE RANDOM mg/dL 105 134                 Results from last 7 days   Lab Units 09/09/24  0509 09/08/24  0916 09/08/24  0850   LACTIC ACID mmol/L  --  2.3*  --    PROCALCITONIN ng/ml <0.05  --  <0.05       Lines/Drains:  Invasive Devices       Peripheral Intravenous Line  Duration             Peripheral IV 09/08/24 Left Antecubital 1 day    Peripheral IV 09/08/24 Dorsal (posterior);Left Hand <1 day                          Imaging: No pertinent imaging reviewed.    Recent Cultures  (last 7 days):   Results from last 7 days   Lab Units 09/08/24  0916   BLOOD CULTURE  Received in Microbiology Lab. Culture in Progress.       Last 24 Hours Medication List:   Current Facility-Administered Medications   Medication Dose Route Frequency Provider Last Rate    acetaminophen  650 mg Oral Q6H PRN Courtney Cantor PA-C      ampicillin-sulbactam  3 g Intravenous Q6H Courtney Cantor PA-C 3 g (09/09/24 0530)    fluticasone  2 spray Nasal Daily Courtney Cantor PA-C      Fluticasone Furoate-Vilanterol  1 puff Inhalation Daily Courtney Cantor PA-C      guaiFENesin  600 mg Oral BID Courtney Cantor PA-C      heparin (porcine)  5,000 Units Subcutaneous Q8H HUMBERTO Courtney Cantor PA-C      levalbuterol  0.63 mg Nebulization Q6H PRN Courtney Cantor PA-C      pravastatin  80 mg Oral Daily With Dinner Courtney Cantor PA-C      tamsulosin  0.4 mg Oral Daily Courtney Cantor PA-C          Today, Patient Was Seen By: Courtney Cantor PA-C    **Please Note: This note may have been constructed using a voice recognition system.**

## 2024-09-09 NOTE — ASSESSMENT & PLAN NOTE
Presented to ED with worsening shortness of breath and cough over the past few days  Diagnosed with COVID 3 weeks ago and has completed a course of Paxlovid, had persistent body aches, fatigue and cough -now with productive cough and green sputum  Started on Augmentin by his PCP, worsening symptoms    CT chest with multifocal pneumonia greater on the right and greatest in the dependent lower lobes -concerning for superimposed bacterial pneumonia or aspiration  Urine strep and legionella ordered, Sputum culture ordered  Blood Culture pending  Lab Results   Component Value Date    WBC 12.06 (H) 09/09/2024    PROCALCITONI <0.05 09/09/2024     Trend fever curve  Initially given ceftriaxone in the emergency department, had hives and antibiotics discontinued -marked as allergy  Will continue with Unasyn  Appreciate speech therapy consult -patient has longstanding history of aspiration  Video swallow study ordered

## 2024-09-09 NOTE — SPEECH THERAPY NOTE
Speech Language/Pathology  Speech Pathology Videofluoroscopic Swallow Study (VFSS/VBSS/MBSS)      Patient Name: James Ortiz    Today's Date: 9/9/2024       General Information;  Pt is a 84 y.o. male with a PMH remarkable for hernia repair, frequent EGDs w/ dilation, urinary retention, asthma, covid ~2 weeks ago  .    Current concerns for dysphagia include multifocal PNA and hx of dysphagia.      A VFSS was recommended to assess oropharyngeal stage swallowing skills at this time. Pt was viewed in lateral position and assessed with thin liquid (by teaspoon, single and successive cup/straw sips), puree, soft moist food (sliced banana) and solid food (sandwich and cracker) and a13mm pill with thin liquid.      Oral stage:  Pt presented with WFL oral stage of swallow    Lip closure:  no escape  Mastication: timely and efficient    Bolus Transport/Lingual Motion: brisk  Oral residue:   at least mild residue on oral structures, major of bolus remaining, minimal to no clearance  Tongue Control:  posterior escape of  greater than half of the bolus  Swallow Initiation: bolus head at posterior laryngeal surface of epiglottis    Pharyngeal stage:  Pt presented with mild pharyngeal dysphagia.     Soft palate elevation:  no bolus between soft palate and pharyngeal wall   Laryngeal elevation: complete     Anterior hyoid excursion:    partial    Epiglottic movement:  complete    Laryngeal vestibule closure:   complete    Tongue base retraction:   narrow column of contrast/air between TB and PW  Pharyngeal Stripping:  diminished   Pharyngeal Contraction:  incomplete     PES opening:    minimal distention or duration with marked obstruction of flow  Pharyngeal Residue: Mild-moderate retention in valleculae and pyriform sinus. Intermittent retrograde mvmt of trace amt of bolus from PES due to significant CP bar    Management of food/liquid/barium tablet follows:   No aspiration on study  Transient penetration of thins via straw  (successive sips) which was expelled from airway within the same swallow without sensory response.  Prominent CP bar which likely contributes to coughing and expelling food particles after meals     Penetration/Aspiration:  Thin: PAS - 2  Puree: PAS- 1  Solid: PAS- 1  Response to Aspiration: N/A           8-Point Penetration-Aspiration Scale   1 Material does not enter the airway   2 Material enters the airway, remains above the vocal folds, and is ejected  from the  airway    3 Material enters the airway, remains above the vocal folds, and is not ejected from the airway   4 Material enters the airway, contacts the vocal folds, and is ejected from the airway   5 Material enters the airway, contacts the vocal folds, and is not ejected from the airway    6 Material enters the airway, passes below the vocal folds and is ejected into the larynx or out of the airway    7 Material enters the airway, passes below the vocal folds, and is not ejected from the trachea despite effort    8 Material enters the airway, passes below the vocal folds, and no effort is made to eject         Strategies and Efficacy: N/A    Aspiration Response and Efficacy:  N/A    Esophageal stage:  Brief view of esophagus was completed.  Re: esophageal clearance, complete clearance noted        Assessment Summary:    Pt presents with mild oropharyngeal dysphagia characterized by decreased laryngeal excursion but adequate elevation and airway protection. Significant CP bar noted which obstructed bolus flow and resulted in trace retrograde movement to pyriforms.  No aspiration on study.  Transient penetration with successive sips of thins via straw, expelled within same swallow without sensory response.  Mild-moderate pharyngeal retention with solids secondary to impaired pharyngeal squeeze.   See above for information on prominent CP bar.      Note: Images are available for review in PACS as desired.      Recommendations:   Recommended Diet:  regular  diet and thin liquids   Recommended Form of Medications: whole with liquid   Aspiration precautions and compensatory swallowing strategies: upright posture and alternating bites and sips  Consider referral to:  ENT d/t prominent CP bar  SLP Dysphagia therapy recommended: None at this time    Results Reviewed with: patient and RN   Pt/Family Education: Completed. 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

## 2024-09-09 NOTE — PLAN OF CARE
Problem: PAIN - ADULT  Goal: Verbalizes/displays adequate comfort level or baseline comfort level  Description: Interventions:  - Encourage patient to monitor pain and request assistance  - Assess pain using appropriate pain scale  - Administer analgesics based on type and severity of pain and evaluate response  - Implement non-pharmacological measures as appropriate and evaluate response  - Consider cultural and social influences on pain and pain management  - Notify physician/advanced practitioner if interventions unsuccessful or patient reports new pain  Outcome: Progressing     Problem: INFECTION - ADULT  Goal: Absence or prevention of progression during hospitalization  Description: INTERVENTIONS:  - Assess and monitor for signs and symptoms of infection  - Monitor lab/diagnostic results  - Monitor all insertion sites, i.e. indwelling lines, tubes, and drains  - Monitor endotracheal if appropriate and nasal secretions for changes in amount and color  - [place] appropriate cooling/warming therapies per order  - Administer medications as ordered  - Instruct and encourage patient and family to use good hand hygiene technique  - Identify and instruct in appropriate isolation precautions for identified infection/condition  Outcome: Progressing  Goal: Absence of fever/infection during neutropenic period  Description: INTERVENTIONS:  - Monitor WBC    Outcome: Progressing     Problem: SAFETY ADULT  Goal: Patient will remain free of falls  Description: INTERVENTIONS:  - Educate patient/family on patient safety including physical limitations  - Instruct patient to call for assistance with activity   - Consult OT/PT to assist with strengthening/mobility   - Keep Call bell within reach  - Keep bed low and locked with side rails adjusted as appropriate  - Keep care items and personal belongings within reach  - Initiate and maintain comfort rounds  - Make Fall Risk Sign visible to staff  - Offer Toileting every 2 Hours,  in advance of need  - Initiate/Maintain alarm  - Obtain necessary fall risk management equipment:   - Apply yellow socks and bracelet for high fall risk patients  - Consider moving patient to room near nurses station  Outcome: Progressing  Goal: Maintain or return to baseline ADL function  Description: INTERVENTIONS:  -  Assess patient's ability to carry out ADLs; assess patient's baseline for ADL function and identify physical deficits which impact ability to perform ADLs (bathing, care of mouth/teeth, toileting, grooming, dressing, etc.)  - Assess/evaluate cause of self-care deficits   - Assess range of motion  - Assess patient's mobility; develop plan if impaired  - Assess patient's need for assistive devices and provide as appropriate  - Encourage maximum independence but intervene and supervise when necessary  - Involve family in performance of ADLs  - Assess for home care needs following discharge   - Consider OT consult to assist with ADL evaluation and planning for discharge  - Provide patient education as appropriate  Outcome: Progressing  Goal: Maintains/Returns to pre admission functional level  Description: INTERVENTIONS:  - Perform AM-PAC 6 Click Basic Mobility/ Daily Activity assessment daily.  - Set and communicate daily mobility goal to care team and patient/family/caregiver.   - Collaborate with rehabilitation services on mobility goals if consulted  - Perform Range of Motion 3 times a day.  - Reposition patient every 2 hours.  - Dangle patient 3 times a day  - Stand patient 3 times a day  - Ambulate patient 3 times a day  - Out of bed to chair 3 times a day   - Out of bed for meals 3 times a day  - Out of bed for toileting  - Record patient progress and toleration of activity level   Outcome: Progressing     Problem: DISCHARGE PLANNING  Goal: Discharge to home or other facility with appropriate resources  Description: INTERVENTIONS:  - Identify barriers to discharge w/patient and caregiver  -  Arrange for needed discharge resources and transportation as appropriate  - Identify discharge learning needs (meds, wound care, etc.)  - Arrange for interpretive services to assist at discharge as needed  - Refer to Case Management Department for coordinating discharge planning if the patient needs post-hospital services based on physician/advanced practitioner order or complex needs related to functional status, cognitive ability, or social support system  Outcome: Progressing     Problem: Knowledge Deficit  Goal: Patient/family/caregiver demonstrates understanding of disease process, treatment plan, medications, and discharge instructions  Description: Complete learning assessment and assess knowledge base.  Interventions:  - Provide teaching at level of understanding  - Provide teaching via preferred learning methods  Outcome: Progressing

## 2024-09-09 NOTE — PLAN OF CARE
Problem: PAIN - ADULT  Goal: Verbalizes/displays adequate comfort level or baseline comfort level  Description: Interventions:  - Encourage patient to monitor pain and request assistance  - Assess pain using appropriate pain scale  - Administer analgesics based on type and severity of pain and evaluate response  - Implement non-pharmacological measures as appropriate and evaluate response  - Consider cultural and social influences on pain and pain management  - Notify physician/advanced practitioner if interventions unsuccessful or patient reports new pain  Outcome: Progressing     Problem: INFECTION - ADULT  Goal: Absence or prevention of progression during hospitalization  Description: INTERVENTIONS:  - Assess and monitor for signs and symptoms of infection  - Monitor lab/diagnostic results  - Monitor all insertion sites, i.e. indwelling lines, tubes, and drains  - Monitor endotracheal if appropriate and nasal secretions for changes in amount and color  - [place] appropriate cooling/warming therapies per order  - Administer medications as ordered  - Instruct and encourage patient and family to use good hand hygiene technique  - Identify and instruct in appropriate isolation precautions for identified infection/condition  Outcome: Progressing  Goal: Absence of fever/infection during neutropenic period  Description: INTERVENTIONS:  - Monitor WBC    Outcome: Progressing     Problem: DISCHARGE PLANNING  Goal: Discharge to home or other facility with appropriate resources  Description: INTERVENTIONS:  - Identify barriers to discharge w/patient and caregiver  - Arrange for needed discharge resources and transportation as appropriate  - Identify discharge learning needs (meds, wound care, etc.)  - Arrange for interpretive services to assist at discharge as needed  - Refer to Case Management Department for coordinating discharge planning if the patient needs post-hospital services based on physician/advanced  practitioner order or complex needs related to functional status, cognitive ability, or social support system  Outcome: Progressing

## 2024-09-10 VITALS
DIASTOLIC BLOOD PRESSURE: 72 MMHG | TEMPERATURE: 97.5 F | BODY MASS INDEX: 24.3 KG/M2 | SYSTOLIC BLOOD PRESSURE: 128 MMHG | OXYGEN SATURATION: 91 % | WEIGHT: 169.75 LBS | HEART RATE: 74 BPM | RESPIRATION RATE: 16 BRPM | HEIGHT: 70 IN

## 2024-09-10 LAB
ANION GAP SERPL CALCULATED.3IONS-SCNC: 7 MMOL/L (ref 4–13)
BUN SERPL-MCNC: 20 MG/DL (ref 5–25)
CALCIUM SERPL-MCNC: 8.6 MG/DL (ref 8.4–10.2)
CHLORIDE SERPL-SCNC: 107 MMOL/L (ref 96–108)
CO2 SERPL-SCNC: 26 MMOL/L (ref 21–32)
CREAT SERPL-MCNC: 0.72 MG/DL (ref 0.6–1.3)
ERYTHROCYTE [DISTWIDTH] IN BLOOD BY AUTOMATED COUNT: 12.8 % (ref 11.6–15.1)
GFR SERPL CREATININE-BSD FRML MDRD: 85 ML/MIN/1.73SQ M
GLUCOSE SERPL-MCNC: 87 MG/DL (ref 65–140)
HCT VFR BLD AUTO: 38.5 % (ref 36.5–49.3)
HGB BLD-MCNC: 12.6 G/DL (ref 12–17)
MCH RBC QN AUTO: 31.2 PG (ref 26.8–34.3)
MCHC RBC AUTO-ENTMCNC: 32.7 G/DL (ref 31.4–37.4)
MCV RBC AUTO: 95 FL (ref 82–98)
PLATELET # BLD AUTO: 295 THOUSANDS/UL (ref 149–390)
PMV BLD AUTO: 9.2 FL (ref 8.9–12.7)
POTASSIUM SERPL-SCNC: 4 MMOL/L (ref 3.5–5.3)
RBC # BLD AUTO: 4.04 MILLION/UL (ref 3.88–5.62)
SODIUM SERPL-SCNC: 140 MMOL/L (ref 135–147)
WBC # BLD AUTO: 8.43 THOUSAND/UL (ref 4.31–10.16)

## 2024-09-10 PROCEDURE — 80048 BASIC METABOLIC PNL TOTAL CA: CPT

## 2024-09-10 PROCEDURE — 87070 CULTURE OTHR SPECIMN AEROBIC: CPT

## 2024-09-10 PROCEDURE — 85027 COMPLETE CBC AUTOMATED: CPT

## 2024-09-10 PROCEDURE — 87106 FUNGI IDENTIFICATION YEAST: CPT

## 2024-09-10 PROCEDURE — 87205 SMEAR GRAM STAIN: CPT

## 2024-09-10 PROCEDURE — 99239 HOSP IP/OBS DSCHRG MGMT >30: CPT

## 2024-09-10 RX ORDER — BENZONATATE 100 MG/1
100 CAPSULE ORAL 3 TIMES DAILY PRN
Qty: 20 CAPSULE | Refills: 0 | Status: SHIPPED | OUTPATIENT
Start: 2024-09-10

## 2024-09-10 RX ORDER — GUAIFENESIN 600 MG/1
1200 TABLET, EXTENDED RELEASE ORAL EVERY 12 HOURS SCHEDULED
Qty: 28 TABLET | Refills: 0 | Status: SHIPPED | OUTPATIENT
Start: 2024-09-10 | End: 2024-09-17

## 2024-09-10 RX ADMIN — FLUTICASONE PROPIONATE 2 SPRAY: 50 SPRAY, METERED NASAL at 08:45

## 2024-09-10 RX ADMIN — GUAIFENESIN 600 MG: 600 TABLET ORAL at 08:45

## 2024-09-10 RX ADMIN — FLUTICASONE FUROATE AND VILANTEROL TRIFENATATE 1 PUFF: 100; 25 POWDER RESPIRATORY (INHALATION) at 08:45

## 2024-09-10 RX ADMIN — AMPICILLIN SODIUM AND SULBACTAM SODIUM 3 G: 100; 50 INJECTION, POWDER, FOR SOLUTION INTRAVENOUS at 01:12

## 2024-09-10 RX ADMIN — HEPARIN SODIUM 5000 UNITS: 5000 INJECTION INTRAVENOUS; SUBCUTANEOUS at 05:09

## 2024-09-10 RX ADMIN — TAMSULOSIN HYDROCHLORIDE 0.4 MG: 0.4 CAPSULE ORAL at 08:45

## 2024-09-10 RX ADMIN — AMPICILLIN SODIUM AND SULBACTAM SODIUM 3 G: 100; 50 INJECTION, POWDER, FOR SOLUTION INTRAVENOUS at 05:09

## 2024-09-10 NOTE — DISCHARGE SUMMARY
Discharge Summary - Hospitalist   Name: James Ortiz 84 y.o. male I MRN: [id number]  Unit/Bed#: -01 I Date of Admission: 9/8/2024   Date of Service: 9/10/2024 I Hospital Day: 2     Assessment & Plan  Multifocal pneumonia  Presented to ED with worsening shortness of breath and cough over the past few days  Diagnosed with COVID 3 weeks ago and has completed a course of Paxlovid, had persistent body aches, fatigue and cough -now with productive cough and green sputum  Started on Augmentin by his PCP, worsening symptoms    CT chest with multifocal pneumonia greater on the right and greatest in the dependent lower lobes -concerning for superimposed bacterial pneumonia or aspiration  Urine strep and legionella ordered, Sputum culture ordered  Blood Culture negative at 24 hours  Lab Results   Component Value Date    WBC 8.43 09/10/2024    PROCALCITONI <0.05 09/09/2024     Trend fever curve  Initially given ceftriaxone in the emergency department, had hives and antibiotics discontinued -marked as allergy  Will continue with Unasyn  Appreciate speech therapy consult -patient has longstanding history of aspiration  Video swallow study ordered -recommend regular texture diet and thin liquids  Asthma  Patient with asthma and is maintained on daily maintenance inhaler  Typically has not had to use his rescue albuterol inhaler in years, has been utilizing more frequently  Currently no wheezing on exam  Can continue his home inhaler as well as as needed nebulizer  Elevated lactic acid level  Mildly elevated lactic acid in setting of pneumonia and dehydration  Given IV fluid hydration  Does not meet sepsis criteria    Medical Problems      Discharging Physician / Practitioner: Courtney Cantor PA-C  PCP: Dave Morrison DO  Admission Date:   Admission Orders (From admission, onward)       Ordered        09/08/24 1222  INPATIENT ADMISSION  Once                          Discharge Date: 09/10/24    Consultations During Hospital  "Stay:  SLP    Procedures Performed:   VBS    Significant Findings / Test Results:   XR chest 1 view portable - Result Date: 9/9/2024  Patchy bilateral pneumonia, greater on the right. See subsequent chest CT.     CT chest without contrast - Result Date: 9/8/2024  Multifocal pneumonia, greater on the right and greatest in the dependent lower lobes. The appearance is not typical for COVID-19 pneumonia and could be due to superimposed bacterial pneumonia or aspiration. Increased conspicuity of multifocal hyperdensity in the lower lobes, question remote aspiration of barium or diffuse pulmonary ossification.    Lab Results   Component Value Date    BLOODCX No Growth at 24 hrs. 09/08/2024         Incidental Findings:   None   I reviewed the above mentioned incidental findings with the patient and/or family and they expressed understanding.    Test Results Pending at Discharge (will require follow up):   None     Outpatient Tests Requested:  None    Complications:  None    Reason for Admission: Pneumonia    Hospital Course:   James Ortiz is a 84 y.o. male patient with history of aspiration, asthma, recent COVID infection who originally presented to the hospital on 9/8/2024 due to worsening shortness of breath and cough.  Found to have multifocal pneumonia on CAT scan.  Seen by speech therapy and had video swallow, cleared for regular textures.  Improved on Unasyn and will be discharged home on oral antibiotics to finish course.  Recommend follow-up with PCP    Please see above list of diagnoses and related plan for additional information.     Condition at Discharge: stable    Discharge Day Visit / Exam:   Subjective: Seen and examined.  Feeling well, still having sputum production.  Vitals: Blood Pressure: 128/72 (09/10/24 0720)  Pulse: 74 (09/10/24 0720)  Temperature: 97.5 °F (36.4 °C) (09/10/24 0720)  Temp Source: Temporal (09/08/24 1454)  Respirations: 16 (09/10/24 0720)  Height: 5' 10\" (177.8 cm) (09/08/24 " 1454)  Weight - Scale: 77 kg (169 lb 12.1 oz) (09/08/24 1454)  SpO2: 95 % (09/10/24 0720)  Exam:   Physical Exam  Vitals and nursing note reviewed.   Constitutional:       Appearance: He is not ill-appearing.   HENT:      Head: Normocephalic and atraumatic.      Nose: Nose normal. No congestion.      Mouth/Throat:      Mouth: Mucous membranes are dry.      Pharynx: Oropharynx is clear.   Eyes:      Conjunctiva/sclera: Conjunctivae normal.   Cardiovascular:      Rate and Rhythm: Normal rate and regular rhythm.      Pulses: Normal pulses.      Heart sounds: Normal heart sounds. No murmur heard.  Pulmonary:      Effort: Pulmonary effort is normal. No respiratory distress.      Breath sounds: Normal breath sounds.   Abdominal:      General: Bowel sounds are normal.      Palpations: Abdomen is soft.      Tenderness: There is no abdominal tenderness.   Musculoskeletal:         General: Normal range of motion.      Cervical back: Normal range of motion.   Skin:     General: Skin is warm and dry.   Neurological:      Mental Status: He is oriented to person, place, and time.   Psychiatric:         Mood and Affect: Mood normal.         Behavior: Behavior normal.          Discussion with Family: Patient declined call to .     Discharge instructions/Information to patient and family:   See after visit summary for information provided to patient and family.      Provisions for Follow-Up Care:  See after visit summary for information related to follow-up care and any pertinent home health orders.      Mobility at time of Discharge:   Basic Mobility Inpatient Raw Score: 23  JH-HLM Goal: 7: Walk 25 feet or more  JH-HLM Achieved: 7: Walk 25 feet or more  HLM Goal achieved. Continue to encourage appropriate mobility.     Disposition:   Home    Planned Readmission: None    Discharge Medications:  See after visit summary for reconciled discharge medications provided to patient and/or family.      Administrative Statements    Discharge Statement:  I have spent a total time of 45 minutes in caring for this patient on the day of the visit/encounter. >30 minutes of time was spent on: Discussion of lab results, treatment management and education.    **Please Note: This note may have been constructed using a voice recognition system**

## 2024-09-10 NOTE — PLAN OF CARE
Problem: PAIN - ADULT  Goal: Verbalizes/displays adequate comfort level or baseline comfort level  Description: Interventions:  - Encourage patient to monitor pain and request assistance  - Assess pain using appropriate pain scale  - Administer analgesics based on type and severity of pain and evaluate response  - Implement non-pharmacological measures as appropriate and evaluate response  - Consider cultural and social influences on pain and pain management  - Notify physician/advanced practitioner if interventions unsuccessful or patient reports new pain  Outcome: Progressing     Problem: INFECTION - ADULT  Goal: Absence or prevention of progression during hospitalization  Description: INTERVENTIONS:  - Assess and monitor for signs and symptoms of infection  - Monitor lab/diagnostic results  - Monitor all insertion sites, i.e. indwelling lines, tubes, and drains  - Monitor endotracheal if appropriate and nasal secretions for changes in amount and color  - [place] appropriate cooling/warming therapies per order  - Administer medications as ordered  - Instruct and encourage patient and family to use good hand hygiene technique  - Identify and instruct in appropriate isolation precautions for identified infection/condition  Outcome: Progressing  Goal: Absence of fever/infection during neutropenic period  Description: INTERVENTIONS:  - Monitor WBC    Outcome: Progressing     Problem: SAFETY ADULT  Goal: Patient will remain free of falls  Description: INTERVENTIONS:  - Educate patient/family on patient safety including physical limitations  - Instruct patient to call for assistance with activity   - Consult OT/PT to assist with strengthening/mobility   - Keep Call bell within reach  - Keep bed low and locked with side rails adjusted as appropriate  - Keep care items and personal belongings within reach  - Initiate and maintain comfort rounds  - Make Fall Risk Sign visible to staff  - Apply yellow socks and bracelet  for high fall risk patients  - Consider moving patient to room near nurses station  Outcome: Progressing  Goal: Maintain or return to baseline ADL function  Description: INTERVENTIONS:  -  Assess patient's ability to carry out ADLs; assess patient's baseline for ADL function and identify physical deficits which impact ability to perform ADLs (bathing, care of mouth/teeth, toileting, grooming, dressing, etc.)  - Assess/evaluate cause of self-care deficits   - Assess range of motion  - Assess patient's mobility; develop plan if impaired  - Assess patient's need for assistive devices and provide as appropriate  - Encourage maximum independence but intervene and supervise when necessary  - Involve family in performance of ADLs  - Assess for home care needs following discharge   - Consider OT consult to assist with ADL evaluation and planning for discharge  - Provide patient education as appropriate  Outcome: Progressing  Goal: Maintains/Returns to pre admission functional level  Description: INTERVENTIONS:  - Perform AM-PAC 6 Click Basic Mobility/ Daily Activity assessment daily.  - Set and communicate daily mobility goal to care team and patient/family/caregiver.   - Collaborate with rehabilitation services on mobility goals if consulted  - Perform Range of Motion 3 times a day.  - Reposition patient every 2 hours.  - Dangle patient 3 times a day  - Stand patient 3 times a day  - Ambulate patient 3 times a day  - Out of bed to chair 3 times a day   - Out of bed for meals 3 times a day  - Out of bed for toileting  - Record patient progress and toleration of activity level   Outcome: Progressing     Problem: DISCHARGE PLANNING  Goal: Discharge to home or other facility with appropriate resources  Description: INTERVENTIONS:  - Identify barriers to discharge w/patient and caregiver  - Arrange for needed discharge resources and transportation as appropriate  - Identify discharge learning needs (meds, wound care, etc.)  -  Arrange for interpretive services to assist at discharge as needed  - Refer to Case Management Department for coordinating discharge planning if the patient needs post-hospital services based on physician/advanced practitioner order or complex needs related to functional status, cognitive ability, or social support system  Outcome: Progressing     Problem: Knowledge Deficit  Goal: Patient/family/caregiver demonstrates understanding of disease process, treatment plan, medications, and discharge instructions  Description: Complete learning assessment and assess knowledge base.  Interventions:  - Provide teaching at level of understanding  - Provide teaching via preferred learning methods  Outcome: Progressing     Problem: RESPIRATORY - ADULT  Goal: Achieves optimal ventilation and oxygenation  Description: INTERVENTIONS:  - Assess for changes in respiratory status confusion cyanosis  - Assess for changes in mentation and behavior  - Position to facilitate oxygenation and minimize respiratory effort  - Oxygen administered by appropriate delivery if ordered  - Initiate smoking cessation education as indicated  - Encourage broncho-pulmonary hygiene including cough, deep breathe, Incentive Spirometry  - Assess the need for suctioning and aspirate as needed  - Assess and instruct to report SOB or any respiratory difficulty  - Respiratory Therapy support as indicated  Outcome: Progressing

## 2024-09-10 NOTE — DISCHARGE INSTR - AVS FIRST PAGE
Dear James Ortiz,     It was our pleasure to care for you here at [facility]. For follow up as well as any medication refills, we recommend that you follow up with your primary care physician. Here are the most important instructions/ recommendations at discharge:     Notable Medication Adjustments -   Continue taking antibiotics with Augmentin for 5 additional days (can use the medication that you have at home and fill new prescription in 2 days)  Take Mucinex 1200 mg twice daily for 7 days   Can take Tessalon Perles as needed for cough  Important follow up information -   Follow-up with PCP  Can follow-up with [facility]  Other Instructions -   ***  Please review this entire after visit summary as additional general instructions including medication list, appointments, activity, diet, any pertinent wound care, and other additional recommendations from your care team that may be provided for you.      Sincerely,     Courtney Cantor PA-C

## 2024-09-10 NOTE — ASSESSMENT & PLAN NOTE
Presented to ED with worsening shortness of breath and cough over the past few days  Diagnosed with COVID 3 weeks ago and has completed a course of Paxlovid, had persistent body aches, fatigue and cough -now with productive cough and green sputum  Started on Augmentin by his PCP, worsening symptoms    CT chest with multifocal pneumonia greater on the right and greatest in the dependent lower lobes -concerning for superimposed bacterial pneumonia or aspiration  Urine strep and legionella ordered, Sputum culture ordered  Blood Culture negative at 24 hours  Lab Results   Component Value Date    WBC 8.43 09/10/2024    PROCALCITONI <0.05 09/09/2024     Trend fever curve  Initially given ceftriaxone in the emergency department, had hives and antibiotics discontinued -marked as allergy  Will continue with Unasyn  Appreciate speech therapy consult -patient has longstanding history of aspiration  Video swallow study ordered -recommend regular texture diet and thin liquids

## 2024-09-12 LAB
BACTERIA SPT RESP CULT: ABNORMAL
BACTERIA SPT RESP CULT: ABNORMAL
GRAM STN SPEC: ABNORMAL
GRAM STN SPEC: ABNORMAL

## 2024-09-13 LAB — BACTERIA BLD CULT: NORMAL

## 2024-10-08 PROBLEM — J18.9 MULTIFOCAL PNEUMONIA: Status: RESOLVED | Noted: 2024-09-08 | Resolved: 2024-10-08

## 2024-11-01 DIAGNOSIS — Z00.6 ENCOUNTER FOR EXAMINATION FOR NORMAL COMPARISON OR CONTROL IN CLINICAL RESEARCH PROGRAM: ICD-10-CM

## 2024-11-13 ENCOUNTER — APPOINTMENT (OUTPATIENT)
Dept: LAB | Facility: HOSPITAL | Age: 84
End: 2024-11-13

## 2024-11-13 DIAGNOSIS — Z00.6 ENCOUNTER FOR EXAMINATION FOR NORMAL COMPARISON OR CONTROL IN CLINICAL RESEARCH PROGRAM: ICD-10-CM

## 2024-11-13 PROCEDURE — 36415 COLL VENOUS BLD VENIPUNCTURE: CPT

## 2024-11-25 LAB
APOB+LDLR+PCSK9 GENE MUT ANL BLD/T: NOT DETECTED
BRCA1+BRCA2 DEL+DUP + FULL MUT ANL BLD/T: NOT DETECTED
MLH1+MSH2+MSH6+PMS2 GN DEL+DUP+FUL M: NOT DETECTED

## 2025-01-25 ENCOUNTER — OFFICE VISIT (OUTPATIENT)
Dept: URGENT CARE | Facility: CLINIC | Age: 85
End: 2025-01-25
Payer: MEDICARE

## 2025-01-25 VITALS
BODY MASS INDEX: 24.34 KG/M2 | OXYGEN SATURATION: 95 % | HEART RATE: 87 BPM | HEIGHT: 70 IN | DIASTOLIC BLOOD PRESSURE: 63 MMHG | WEIGHT: 170 LBS | SYSTOLIC BLOOD PRESSURE: 107 MMHG | TEMPERATURE: 97.2 F | RESPIRATION RATE: 20 BRPM

## 2025-01-25 DIAGNOSIS — H10.9 BACTERIAL CONJUNCTIVITIS OF RIGHT EYE: Primary | ICD-10-CM

## 2025-01-25 PROCEDURE — 99212 OFFICE O/P EST SF 10 MIN: CPT

## 2025-01-25 PROCEDURE — G0463 HOSPITAL OUTPT CLINIC VISIT: HCPCS

## 2025-01-25 RX ORDER — OMEPRAZOLE 40 MG/1
40 CAPSULE, DELAYED RELEASE ORAL DAILY
COMMUNITY

## 2025-01-25 RX ORDER — POLYMYXIN B SULFATE AND TRIMETHOPRIM 1; 10000 MG/ML; [USP'U]/ML
1 SOLUTION OPHTHALMIC EVERY 4 HOURS
Qty: 10 ML | Refills: 0 | Status: SHIPPED | OUTPATIENT
Start: 2025-01-25

## 2025-01-25 RX ORDER — MOMETASONE FUROATE MONOHYDRATE 50 UG/1
2 SPRAY, METERED NASAL DAILY
COMMUNITY

## 2025-01-25 NOTE — PATIENT INSTRUCTIONS
Use Polytrim as prescribed   Take over the counter Claritin  Apply cold compresses  Avoid touching eyes  Wash hands frequently  Change pillowcases daily  Follow up with ophthalmologist if symptoms do not resolve

## 2025-01-25 NOTE — PROGRESS NOTES
[facility]        NAME: James Ortiz is a 84 y.o. male  : 1940    MRN: [id number]  DATE: 2025  TIME: 1:19 PM    Assessment and Plan   Bacterial conjunctivitis of right eye [H10.9]  1. Bacterial conjunctivitis of right eye  polymyxin b-trimethoprim (POLYTRIM) ophthalmic solution            Patient Instructions     Patient Instructions   Use Polytrim as prescribed   Take over the counter Claritin  Apply cold compresses  Avoid touching eyes  Wash hands frequently  Change pillowcases daily  Follow up with ophthalmologist if symptoms do not resolve          Follow up with PCP in 3-5 days.  Proceed to  ER if symptoms worsen.    Chief Complaint     Chief Complaint   Patient presents with    Eye Problem     Patient states he had saw dust in his eye last Saturday. Patient began on Thursday with pain in the corner of his right eye          History of Present Illness       84-year-old male presenting with right sided eye redness and irritation x 2 days.  Patient states that symptoms began on Thursday when he began to notice corner of his right eye to be having some pain with redness.  Patient states that it has progressively gotten more red, itchy and slightly painful on the sides of his eye.  Patient states that last week on Saturday 5 days prior to his symptoms began he had some slight sawdust got in his eye but did not notice anything after he cleaned and washed them out.  Patient denied any abnormality throughout the week up until the symptoms began on Thursday.  Patient denies any sick contact.  She reports using artificial tears help lubricating his eyes with mild to moderate relief.  Patient denies any use of contacts but reports using reading glasses as needed.  Patient states eyes mainly draining clear tears however noticed some purulent drainage.    Eye Problem   Associated symptoms include an eye discharge, eye redness and itching. Pertinent negatives include no fever, nausea,  photophobia or vomiting.       Review of Systems   Review of Systems   Constitutional:  Negative for chills, fatigue and fever.   HENT:  Negative for congestion, ear pain, rhinorrhea and sore throat.    Eyes:  Positive for pain, discharge, redness and itching. Negative for photophobia and visual disturbance.   Respiratory:  Negative for cough and shortness of breath.    Cardiovascular:  Negative for chest pain and palpitations.   Gastrointestinal:  Negative for abdominal pain, diarrhea, nausea and vomiting.   Musculoskeletal:  Negative for arthralgias and myalgias.   Neurological:  Negative for light-headedness and headaches.         Current Medications       Current Outpatient Medications:     Fluticasone Furoate-Vilanterol 100-25 mcg/actuation inhaler, Inhale 1 puff daily, Disp: , Rfl:     mometasone (NASONEX) 50 mcg/act nasal spray, 2 sprays into each nostril daily, Disp: , Rfl:     omeprazole (PriLOSEC) 40 MG capsule, Take 40 mg by mouth daily, Disp: , Rfl:     polymyxin b-trimethoprim (POLYTRIM) ophthalmic solution, Administer 1 drop to the right eye every 4 (four) hours, Disp: 10 mL, Rfl: 0    simvastatin (ZOCOR) 40 mg tablet, Take 40 mg by mouth daily at bedtime, Disp: , Rfl:     tamsulosin (FLOMAX) 0.4 mg, Take 0.4 mg by mouth in the morning, Disp: , Rfl:     benzonatate (TESSALON PERLES) 100 mg capsule, Take 1 capsule (100 mg total) by mouth 3 (three) times a day as needed for cough (Patient not taking: Reported on 1/25/2025), Disp: 20 capsule, Rfl: 0    fluticasone (FLONASE) 50 mcg/act nasal spray, 2 sprays into each nostril daily, Disp: , Rfl:     Current Allergies     Allergies as of 01/25/2025 - Reviewed 01/25/2025   Allergen Reaction Noted    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     Past Medical History:   Diagnosis Date    Asthma     Hyperlipidemia     Urinary retention        Past Surgical History:   Procedure Laterality Date    HERNIA REPAIR      REPLACEMENT TOTAL KNEE BILATERAL      SHOULDER SURGERY Right     TONSILLECTOMY         History reviewed. No pertinent family history.      Medications have been verified.        Objective   /63   Pulse 87   Temp (!) 97.2 °F (36.2 °C) (Tympanic)   Resp 20   Ht 5' 10\" (1.778 m)   Wt 77.1 kg (170 lb)   SpO2 95%   BMI 24.39 kg/m²        Physical Exam     Physical Exam  Vitals and nursing note reviewed.   Constitutional:       General: He is not in acute distress.     Appearance: He is normal weight. He is not ill-appearing.   HENT:      Head: Normocephalic and atraumatic.      Right Ear: Tympanic membrane, ear canal and external ear normal.      Left Ear: Tympanic membrane, ear canal and external ear normal.      Nose: Nose normal. No congestion.      Mouth/Throat:      Mouth: Mucous membranes are moist.      Pharynx: Oropharynx is clear. No posterior oropharyngeal erythema.   Eyes:      General:         Right eye: Discharge present.      Conjunctiva/sclera: Conjunctivae normal.      Pupils: Pupils are equal, round, and reactive to light.      Comments: Nontender eyelid.  No foreign body visualized on exam.  Injected right conjunctive up.  Potential clogged duct on right corner eyelid.  Mild purulent drainage corner of eye.   Cardiovascular:      Rate and Rhythm: Normal rate and regular rhythm.      Pulses: Normal pulses.      Heart sounds: Normal heart sounds.   Pulmonary:      Effort: Pulmonary effort is normal.      Breath sounds: Normal breath sounds. No wheezing.   Abdominal:      General: Abdomen is flat. Bowel sounds are normal.      Palpations: Abdomen is soft.      Tenderness: There is no abdominal tenderness.   Lymphadenopathy:      Cervical: No " cervical adenopathy.   Skin:     General: Skin is warm and dry.      Capillary Refill: Capillary refill takes less than 2 seconds.   Neurological:      General: No focal deficit present.      Mental Status: He is alert and oriented to person, place, and time.   Psychiatric:         Mood and Affect: Mood normal.         Behavior: Behavior normal.